# Patient Record
Sex: MALE | Race: WHITE | Employment: FULL TIME | URBAN - NONMETROPOLITAN AREA
[De-identification: names, ages, dates, MRNs, and addresses within clinical notes are randomized per-mention and may not be internally consistent; named-entity substitution may affect disease eponyms.]

---

## 2022-01-26 ENCOUNTER — OFFICE VISIT (OUTPATIENT)
Dept: VASCULAR SURGERY | Age: 37
End: 2022-01-26
Payer: OTHER GOVERNMENT

## 2022-01-26 VITALS
SYSTOLIC BLOOD PRESSURE: 132 MMHG | TEMPERATURE: 97.8 F | OXYGEN SATURATION: 98 % | HEIGHT: 71 IN | HEART RATE: 87 BPM | DIASTOLIC BLOOD PRESSURE: 98 MMHG

## 2022-01-26 DIAGNOSIS — I70.213 ATHEROSCLEROSIS OF NATIVE ARTERIES OF EXTREMITIES WITH INTERMITTENT CLAUDICATION, BILATERAL LEGS (HCC): Primary | ICD-10-CM

## 2022-01-26 PROCEDURE — 99204 OFFICE O/P NEW MOD 45 MIN: CPT | Performed by: PHYSICIAN ASSISTANT

## 2022-01-26 RX ORDER — MELOXICAM 15 MG/1
1 TABLET ORAL DAILY
COMMUNITY
Start: 2021-12-27 | End: 2022-04-06

## 2022-01-26 RX ORDER — ASPIRIN 81 MG/1
81 TABLET ORAL DAILY
COMMUNITY
End: 2022-03-03 | Stop reason: HOSPADM

## 2022-01-26 NOTE — PROGRESS NOTES
No care team member to display      History and Physical  Mr. Kamar Hodge  is a 25-year-old male who has a history that includes tobacco abuse, a history of chronic back pain status post back surgery. He presents today as referral from Dr. Sabrina Hastings for evaluation of peripheral vascular disease. Mr. Kamar Hodge reports that at approximately 15-20 stents he has pain in his feet that is now starting to radiate up into his calves. He reports at this time the left leg is worse than the right he does report at times that his pain in his feet will wake him up at night. He is currently on aspirin daily. He has had issues with slow healing fissures of the distal toes bilaterally. He does smoke at this time. He also reports that at times he does not drink. Old records have been obtained, reviewed, and summarized. Warden Love is a 39 y.o. male with the [facility] There are no problems to display for this patient. Current Outpatient Medications   Medication Sig Dispense Refill    aspirin 81 MG EC tablet Take 81 mg by mouth daily      meloxicam (MOBIC) 15 MG tablet Take 1 tablet by mouth daily (Patient not taking: Reported on 1/26/2022)       No current facility-administered medications for this visit. Allergies: Sulfa antibiotics  No past medical history on file. No past surgical history on file. No family history on file. Social History     Tobacco Use    Smoking status: Not on file    Smokeless tobacco: Not on file   Substance Use Topics    Alcohol use: Not on file       Review of Systems    Constitutional -   No fever or chills   HENT - no HA's, tinnitus, rhinorrhea, sore throat  Eyes - no sudden vision change or amaurosis. Respiratory - SOB or chest pain  Cardiovascular - no chest pain, syncope, or significant dizziness. No palpitations. See HPI   Gastrointestinal - no significant abdominal pain. No blood in stool.   No diarrhea, nausea, or vomiting. Genitourinary - No difficulty urinating, dysuria, frequency, or urgency. No flank pain or hematuria. Musculoskeletal - no back pain or myalgia. Skin - no rashes or wounds   Neurologic - no dizziness, facial asymmetry, or light headedness. No seizures. No new onset of partial or complete loss of vision affecting only one eye, speech difficulty or lateralizing weakness, numbness/tingling   Hematologic - no excessive bleeding. Psychiatric - no severe anxiety or nervousness. No confusion. All other review of systems are negative. Physical Exam    BP (!) 132/98 (Site: Right Upper Arm)   Pulse 87   Temp 97.8 °F (36.6 °C)   Ht 5' 11\" (1.803 m)   SpO2 98%     Constitutional - No acute distress. HENT - head normocephalic. Hearing is intact   Eyes - conjunctiva normal.  EOMS normal.  No exudate. No icterus. Neck- ROM appears normal, no tracheal deviation. Cardiovascular - Regular rate and rhythm. Heart sounds are normal.  No murmur, rub, or gallop. Carotid pulses bilaterally without bruit. Extremities - Radial and ulnar pulses are 2+ to palpation bilaterally. Bilateral femoral and DP pulses palpable bilaterally. Feet are hyperemic. He has what appears to be healing fissures noted on the distal aspects of his great toes bilaterally. No cyanosis, clubbing, or significant edema. No signs atheroembolic event. Pulmonary - effort appears normal.  No respiratory distress. Lungs - Breath sounds normal.   GI - Abdomen - No distension or palpable mass. Genitourinary - deferred. Musculoskeletal - ROM appears normal.  No significant edema. Neurologic - alert and oriented X 3. Face symmetric. No lateralizing weakness noted. Skin - warm, dry, and intact. No rash, erythema, or pallor.   Psychiatric - mood, affect, and behavior appear normal.  Judgment and thought processes appear normal.  Lower  Risk factors for atherosclerosis of all vascular beds have been reviewed with the patient including:  Family history, tobacco abuse in all forms, elevated cholesterol, hyperlipidemia, and diabetes. Lower extremity arterial Doppler: Done at [facility] 1/13/2022 (full report scanned under media)  Conclusion monophasic flow seen in the bilateral mid posterior tibial arteries occluded bilateral distal posterior tibial arteries  Individual films reviewed: Yes. Test results were reviewed with the patient. Options were discussed with the patient including continued medical management versus proceeding with angiography and potential intervention for PVD with claudication. Patient has opted to proceed with angiography. Risks have been discussed with the patient including but not limited to MI, death, CVA, bleed, nerve injury, infection, contrast nephropathy, possible need for dialysis, and need for further surgery. Assessment      1.  Atherosclerosis of native arteries of extremities with intermittent claudication, bilateral legs (HCC)          Plan      Recommend he continue aspirin 81 mg daily  Recommend he discuss initiation of statin therapy with his PCP  Recommend he walk as much as possible  We discussed smoking cessation    Aortogram with bilateral runoff; possible angioplasty/atherectomy/stent

## 2022-01-27 ENCOUNTER — TELEPHONE (OUTPATIENT)
Dept: VASCULAR SURGERY | Age: 37
End: 2022-01-27

## 2022-01-27 DIAGNOSIS — Z01.818 PRE-OP TESTING: Primary | ICD-10-CM

## 2022-01-27 NOTE — TELEPHONE ENCOUNTER
I sw the pt to let him know he is scheduled for his procedure with Dr. Wade Vance on Mon. 1/31/2022. The pt will need to arrive at [facility] at 0630 and be NPO after midnight the night before. No special instructions with the pt's current medications. He will need to have labs and a covid test on Fri 1/28/2022 before 1430 at the [facility] lab. Please buy a bottle of hibiclens soap, wash all over the night prior and your groin the morning of. You will need a  to take you home. I asked the pt if he had any questions at this time? He declined. Pt voiced understanding and is aware of these instructions.

## 2022-01-28 ENCOUNTER — PREP FOR PROCEDURE (OUTPATIENT)
Dept: VASCULAR SURGERY | Age: 37
End: 2022-01-28

## 2022-01-28 DIAGNOSIS — Z01.818 PRE-OP TESTING: ICD-10-CM

## 2022-01-28 DIAGNOSIS — I70.213 ATHEROSCLEROSIS OF NATIVE ARTERIES OF EXTREMITIES WITH INTERMITTENT CLAUDICATION, BILATERAL LEGS (HCC): Primary | ICD-10-CM

## 2022-01-28 LAB
ANION GAP SERPL CALCULATED.3IONS-SCNC: 15 MMOL/L (ref 7–19)
BUN BLDV-MCNC: 11 MG/DL (ref 6–20)
CALCIUM SERPL-MCNC: 9.4 MG/DL (ref 8.6–10)
CHLORIDE BLD-SCNC: 101 MMOL/L (ref 98–111)
CO2: 22 MMOL/L (ref 22–29)
CREAT SERPL-MCNC: 1 MG/DL (ref 0.5–1.2)
GFR AFRICAN AMERICAN: >59
GFR NON-AFRICAN AMERICAN: >60
GLUCOSE BLD-MCNC: 176 MG/DL (ref 74–109)
HCT VFR BLD CALC: 48.4 % (ref 42–52)
HEMOGLOBIN: 15.8 G/DL (ref 14–18)
MCH RBC QN AUTO: 30 PG (ref 27–31)
MCHC RBC AUTO-ENTMCNC: 32.6 G/DL (ref 33–37)
MCV RBC AUTO: 91.8 FL (ref 80–94)
PDW BLD-RTO: 13.1 % (ref 11.5–14.5)
PLATELET # BLD: 377 K/UL (ref 130–400)
PMV BLD AUTO: 9.7 FL (ref 9.4–12.4)
POTASSIUM SERPL-SCNC: 4.3 MMOL/L (ref 3.5–5)
RBC # BLD: 5.27 M/UL (ref 4.7–6.1)
SODIUM BLD-SCNC: 138 MMOL/L (ref 136–145)
WBC # BLD: 8.2 K/UL (ref 4.8–10.8)

## 2022-01-28 RX ORDER — SODIUM CHLORIDE 9 MG/ML
INJECTION, SOLUTION INTRAVENOUS CONTINUOUS
Status: CANCELLED | OUTPATIENT
Start: 2022-01-28

## 2022-01-28 RX ORDER — SODIUM CHLORIDE 9 MG/ML
25 INJECTION, SOLUTION INTRAVENOUS PRN
Status: CANCELLED | OUTPATIENT
Start: 2022-01-28

## 2022-01-28 RX ORDER — SODIUM CHLORIDE 0.9 % (FLUSH) 0.9 %
10 SYRINGE (ML) INJECTION PRN
Status: CANCELLED | OUTPATIENT
Start: 2022-01-28

## 2022-01-28 RX ORDER — ASPIRIN 81 MG/1
81 TABLET ORAL ONCE
Status: CANCELLED | OUTPATIENT
Start: 2022-01-28 | End: 2022-01-28

## 2022-01-28 NOTE — H&P
Patient Care Team:  Jose Antonio Joe MD as Consulting Physician (Vascular Surgery)      History and Physical  Mr. Jeet Dahl  is a 48-year-old male who has a history that includes tobacco abuse, a history of chronic back pain status post back surgery. He presents today as referral from Dr. Jana Siddiqui for evaluation of peripheral vascular disease. Mr. Jeet Dahl reports that at approximately 15-20 stents he has pain in his feet that is now starting to radiate up into his calves. He reports at this time the left leg is worse than the right he does report at times that his pain in his feet will wake him up at night. He is currently on aspirin daily. He has had issues with slow healing fissures of the distal toes bilaterally. He does smoke at this time. He also reports that at times he does not drink. Old records have been obtained, reviewed, and summarized. Yeni Luna is a 39 y.o. male with the [facility] There are no problems to display for this patient. Current Outpatient Medications   Medication Sig Dispense Refill    meloxicam (MOBIC) 15 MG tablet Take 1 tablet by mouth daily (Patient not taking: Reported on 1/26/2022)      aspirin 81 MG EC tablet Take 81 mg by mouth daily       No current facility-administered medications for this visit. Allergies: Sulfa antibiotics  No past medical history on file. No past surgical history on file. No family history on file. Social History     Tobacco Use    Smoking status: Not on file    Smokeless tobacco: Not on file   Substance Use Topics    Alcohol use: Not on file       Review of Systems    Constitutional -   No fever or chills   HENT - no HA's, tinnitus, rhinorrhea, sore throat  Eyes - no sudden vision change or amaurosis. Respiratory - SOB or chest pain  Cardiovascular - no chest pain, syncope, or significant dizziness. No palpitations. See HPI   Gastrointestinal - no significant abdominal pain.   No blood in stool. No diarrhea, nausea, or vomiting. Genitourinary - No difficulty urinating, dysuria, frequency, or urgency. No flank pain or hematuria. Musculoskeletal - no back pain or myalgia. Skin - no rashes or wounds   Neurologic - no dizziness, facial asymmetry, or light headedness. No seizures. No new onset of partial or complete loss of vision affecting only one eye, speech difficulty or lateralizing weakness, numbness/tingling   Hematologic - no excessive bleeding. Psychiatric - no severe anxiety or nervousness. No confusion. All other review of systems are negative. Physical Exam    Constitutional - No acute distress. HENT - head normocephalic. Hearing is intact   Eyes - conjunctiva normal.  EOMS normal.  No exudate. No icterus. Neck- ROM appears normal, no tracheal deviation. Cardiovascular - Regular rate and rhythm. Heart sounds are normal.  No murmur, rub, or gallop. Carotid pulses bilaterally without bruit. Extremities - Radial and ulnar pulses are 2+ to palpation bilaterally. Bilateral femoral and DP pulses palpable bilaterally. Feet are hyperemic. He has what appears to be healing fissures noted on the distal aspects of his great toes bilaterally. No cyanosis, clubbing, or significant edema. No signs atheroembolic event. Pulmonary - effort appears normal.  No respiratory distress. Lungs - Breath sounds normal.   GI - Abdomen - No distension or palpable mass. Genitourinary - deferred. Musculoskeletal - ROM appears normal.  No significant edema. Neurologic - alert and oriented X 3. Face symmetric. No lateralizing weakness noted. Skin - warm, dry, and intact. No rash, erythema, or pallor.   Psychiatric - mood, affect, and behavior appear normal.  Judgment and thought processes appear normal.  Lower  Risk factors for atherosclerosis of all vascular beds have been reviewed with the patient including:  Family history, tobacco abuse in all forms, elevated cholesterol, hyperlipidemia, and diabetes. Lower extremity arterial Doppler: Done at [facility] 1/13/2022 (full report scanned under media)  Conclusion monophasic flow seen in the bilateral mid posterior tibial arteries occluded bilateral distal posterior tibial arteries  Individual films reviewed: Yes. Test results were reviewed with the patient. Options were discussed with the patient including continued medical management versus proceeding with angiography and potential intervention for PVD with claudication. Patient has opted to proceed with angiography. Risks have been discussed with the patient including but not limited to MI, death, CVA, bleed, nerve injury, infection, contrast nephropathy, possible need for dialysis, and need for further surgery. Assessment      1. Atherosclerosis of native arteries of extremities with intermittent claudication, bilateral legs ([facility] [address]          Plan      Recommend he continue aspirin 81 mg daily  Recommend he discuss initiation of statin therapy with his PCP  Recommend he walk as much as possible  We discussed smoking cessation    Aortogram with bilateral runoff; possible angioplasty/atherectomy/stent        Please note that parts of the chart were generated using Dragon dictation software. Although every effort was made to ensure the accuracy of this automated transcription, some errors in transcription may have occurred.

## 2022-01-29 LAB — SARS-COV-2, PCR: DETECTED

## 2022-01-31 ENCOUNTER — TELEPHONE (OUTPATIENT)
Dept: VASCULAR SURGERY | Age: 37
End: 2022-01-31

## 2022-01-31 NOTE — TELEPHONE ENCOUNTER
I sw the pt to let him know I need to have a few questions answered prior to r/s his procedure. I want to verify the timeframe I'm allowed to r/s a covid + pt. The pt understood and will wait for my call with further recommendation.

## 2022-02-08 ENCOUNTER — TELEPHONE (OUTPATIENT)
Dept: VASCULAR SURGERY | Age: 37
End: 2022-02-08

## 2022-02-08 NOTE — TELEPHONE ENCOUNTER
I sent the pt the following information in [employer].       +++Please make sure to have labs and covid testing at the [address] [place] [address] Patient Lab on Tue 3/1/2022 before 2:00 pm.+++            [facility]    Arteriogram Instructions    1. Report to the [facility] at [facility] (go in the front door and to the left) on [address] 3/3/2022, at 6:00 am.  2. Nothing to eat or drink after midnight the night before the procedure. 3. Please take all medications as normally scheduled to take, including heart and blood pressure medicines with a sip of water. 4. Do not take Lasix, insulin, or any diabetic medicine the morning of the procedure. If you take insulin, you may only take 1/2 of any scheduled nightly dose, and none the morning of the procedure. You may take all regularly scheduled heart, cholesterol, and blood pressure medicines with a sip of water. 5. If you take Glucophage, Metformin, Actos Plus Met, or Glucovance,you can not take this the day of your procedure and two days after the procedure. 6. Hold aspirin for 0 days prior to surgery. 7. If you have sleep apnea and require C-PAP, please bring this with you to the hospital.  8. Bring a list of all of your allergies and medications with you to the hospital.  9. Please let our nurse know if you have had an allergy to iodine, shellfish, or x-ray dye. 10. Let the nurse know if you take any of the followin. Over the counter herbal supplements  2. Diclofenec, indomethacin, ketoprofen, Caridopa/levadopa, naproxen, sulindac, piroxicam, glucosamine, Chondrotin, cocchine, or methotrexate. 11. Plan to stay at the hospital for 4 - 6 hours before being released  by the physician. You will need someone to drive you home after the procedure. 12. Medications instructed to hold: See Above   13. Please stop at your local [facility] or pharmacy and buy a bottle of Hibiclens.  Wash thoroughly with this the night before and the morning of the procedure, paying special attention to the area that will be operated on. Make sure you rinse very well. The Hibiclens should only be used prior to surgery. 15. New policy requires that anyone who comes into the hospital will be required to wear a face mask. A cloth mask is acceptable. 15. To ensure the health and safety of our patients and staff, [facility] has implemented visitor restrictions. Only one person will be allowed to accompany you for your procedure. If you or your visitor are exhibiting signs & symptoms of illness such as fever, cough, sore throat or body aches, we ask that you reschedule your procedure to a later date after your symptoms have been resolved. 16. Other Directions: If you have any questions or concerns please contact our office at [phone] and ask for Imelda Tsai, or reply to this message. Unless instructed otherwise by your physician, cleanse incision/puncture site twice daily with soap and water. Apply dry gauze. Do not get in tub. Okay to shower. Do not apply any salve, cream, peroxide or alcohol to the incision. Call with any increasing redness or drainage    PLEASE NOTE:  If the patient is unable to sign his/her own paperwork, the appointed caregiver (POA, child, sibling, etc) must be present at the time of registration for all testing and procedures. Transportation to and from all testing and procedure appointments is the sole responsibility of the patient, caregiver, and/or nursing facility in which they reside. Please remember you will not be able to drive after you are discharged. Please call the office at [phone] with any questions or concerns. Please allow 48-72 hours notice for cancellations or rescheduling. We will attempt to accommodate your needs to the best of our capabilities, however, strict policies with procedure room availability does not allow much flexibility.

## 2022-02-28 ENCOUNTER — OFFICE VISIT (OUTPATIENT)
Dept: VASCULAR SURGERY | Age: 37
End: 2022-02-28
Payer: OTHER GOVERNMENT

## 2022-02-28 VITALS
TEMPERATURE: 98.6 F | RESPIRATION RATE: 18 BRPM | SYSTOLIC BLOOD PRESSURE: 134 MMHG | HEART RATE: 93 BPM | DIASTOLIC BLOOD PRESSURE: 98 MMHG | OXYGEN SATURATION: 98 %

## 2022-02-28 DIAGNOSIS — I70.213 ATHEROSCLEROSIS OF NATIVE ARTERIES OF EXTREMITIES WITH INTERMITTENT CLAUDICATION, BILATERAL LEGS (HCC): Primary | ICD-10-CM

## 2022-02-28 PROCEDURE — 99214 OFFICE O/P EST MOD 30 MIN: CPT | Performed by: PHYSICIAN ASSISTANT

## 2022-02-28 NOTE — H&P
Patient Care Team:  Joana Whitman MD as Consulting Physician (Vascular Surgery)      History and Physical Addendum)  Mr. Ignacio Jimenez  is a 70-year-old male who has a history that includes tobacco abuse, a history of chronic back pain status post back surgery. He presents today as referral from Dr. Joesph Lal for evaluation of peripheral vascular disease. Mr. Ignacio Jimenez reports that at approximately 15-20 stents he has pain in his feet that is now starting to radiate up into his calves. He reports at this time the left leg is worse than the right he does report at times that his pain in his feet will wake him up at night. He is currently on aspirin daily. He has had issues with slow healing fissures of the distal toes bilaterally. He does smoke at this time. He also reports that at times he does not drink. He comes in today for preop evaluation prior to receiving with angiogram.  He was previously scheduled and had to be rescheduled due to testing positive for Covid. He denies any fever, cough, headaches or nausea vomiting. Lorenzo Grossman is a 39 y.o. male with the following history reviewed and recorded in [employer]: There are no problems to display for this patient. Current Outpatient Medications   Medication Sig Dispense Refill    meloxicam (MOBIC) 15 MG tablet Take 1 tablet by mouth daily       aspirin 81 MG EC tablet Take 81 mg by mouth daily       No current facility-administered medications for this visit. Allergies: Sulfa antibiotics  History reviewed. No pertinent past medical history. History reviewed. No pertinent surgical history. History reviewed. No pertinent family history.   Social History     Tobacco Use    Smoking status: Current Some Day Smoker     Packs/day: 1.00    Smokeless tobacco: Current User   Substance Use Topics    Alcohol use: Not on file       Review of Systems    Constitutional -   No fever or chills   HENT - no HA's, tinnitus, rhinorrhea, sore throat  Eyes - no sudden vision change or amaurosis. Respiratory - SOB or chest pain  Cardiovascular - no chest pain, syncope, or significant dizziness. No palpitations. See HPI   Gastrointestinal - no significant abdominal pain. No blood in stool. No diarrhea, nausea, or vomiting. Genitourinary - No difficulty urinating, dysuria, frequency, or urgency. No flank pain or hematuria. Musculoskeletal - no back pain or myalgia. Skin - no rashes. Superficial wound left great toe  Neurologic - no dizziness, facial asymmetry, or light headedness. No seizures. No new onset of partial or complete loss of vision affecting only one eye, speech difficulty or lateralizing weakness, numbness/tingling   Hematologic - no excessive bleeding. Psychiatric - no severe anxiety or nervousness. No confusion. All other review of systems are negative. Physical Exam    Constitutional - No acute distress. HENT - head normocephalic. Hearing is intact   Eyes - conjunctiva normal.  EOMS normal.  No exudate. No icterus. Neck- ROM appears normal, no tracheal deviation. Cardiovascular - Regular rate and rhythm. Heart sounds are normal.  No murmur, rub, or gallop. Carotid pulses bilaterally without bruit. Extremities - Radial and ulnar pulses are 2+ to palpation bilaterally. Bilateral femoral and DP pulses palpable bilaterally. Feet are hyperemic. He has what appears to be healing fissures noted on the distal aspects of his great toes bilaterally. No cyanosis, clubbing, or significant edema. No signs atheroembolic event. Pulmonary - effort appears normal.  No respiratory distress. Lungs - Breath sounds normal.   GI - Abdomen - No distension or palpable mass. Genitourinary - deferred. Musculoskeletal - ROM appears normal.  No significant edema. Neurologic - alert and oriented X 3. Face symmetric. No lateralizing weakness noted. Skin - warm, dry, and intact. No rash, erythema, or pallor.  Superficial wound distal tip of left great toe psychiatric - mood, affect, and behavior appear normal.  Judgment and thought processes appear normal.        Risk factors for atherosclerosis of all vascular beds have been reviewed with the patient including:  Family history, tobacco abuse in all forms, elevated cholesterol, hyperlipidemia, and diabetes. Lower extremity arterial Doppler: Done at [facility] 1/13/2022 (full report scanned under media)  Conclusion monophasic flow seen in the bilateral mid posterior tibial arteries occluded bilateral distal posterior tibial arteries  Individual films reviewed: Yes. Test results were reviewed with the patient. Options were discussed with the patient including continued medical management versus proceeding with angiography and potential intervention for PVD with claudication. Patient has opted to proceed with angiography. Risks have been discussed with the patient including but not limited to MI, death, CVA, bleed, nerve injury, infection, contrast nephropathy, possible need for dialysis, and need for further surgery. Assessment      1. Atherosclerosis of native arteries of extremities with intermittent claudication, bilateral legs ([facility] [address]          Plan      Recommend he continue aspirin 81 mg daily  Recommend he discuss initiation of statin therapy with his PCP  Recommend he walk as much as possible  We discussed smoking cessation    Aortogram with bilateral runoff; possible angioplasty/atherectomy/stent        Please note that parts of the chart were generated using Dragon dictation software. Although every effort was made to ensure the accuracy of this automated transcription, some errors in transcription may have occurred.

## 2022-02-28 NOTE — PROGRESS NOTES
Patient Care Team:  Sandro Patel MD as Consulting Physician (Vascular Surgery)      History and Physical (Addendum) 2/28/22  Mr. Woody Kwok  is a 34-year-old male who has a history that includes tobacco abuse, a history of chronic back pain status post back surgery. He presents today as referral from Dr. Alpesh Ramírez for evaluation of peripheral vascular disease. Mr. Woody Kwok reports that at approximately 15-20 steps he has pain in his feet that is now starting to radiate up into his calves. He reports at this time the left leg is worse than the right he does report at times that his pain in his feet will wake him up at night. He is currently on aspirin daily. He has had issues with slow healing fissures of the distal toes bilaterally. He does smoke at this time. He also reports that at times he does not drink. Mr. Teddy Lagos procedure had to be rescheduled due to testing positive for Covid. Today he denies any fever, headaches, cough or nausea or vomiting. Old records have been obtained, reviewed, and summarized. Yuan Gatica is a 39 y.o. male with the following history reviewed and recorded in [employer] There are no problems to display for this patient. Current Outpatient Medications   Medication Sig Dispense Refill    meloxicam (MOBIC) 15 MG tablet Take 1 tablet by mouth daily       aspirin 81 MG EC tablet Take 81 mg by mouth daily       No current facility-administered medications for this visit. Allergies: Sulfa antibiotics  History reviewed. No pertinent past medical history. History reviewed. No pertinent surgical history. History reviewed. No pertinent family history.   Social History     Tobacco Use    Smoking status: Current Some Day Smoker     Packs/day: 1.00    Smokeless tobacco: Current User   Substance Use Topics    Alcohol use: Not on file       Review of Systems    Constitutional -   No fever or chills   HENT - no HA's, tinnitus, rhinorrhea, sore throat  Eyes - no sudden vision change or amaurosis. Respiratory - SOB or chest pain  Cardiovascular - no chest pain, syncope, or significant dizziness. No palpitations. See HPI   Gastrointestinal - no significant abdominal pain. No blood in stool. No diarrhea, nausea, or vomiting. Genitourinary - No difficulty urinating, dysuria, frequency, or urgency. No flank pain or hematuria. Musculoskeletal - no back pain or myalgia. Skin - no rashes. Superficial wound distal tip left great toe  Neurologic - no dizziness, facial asymmetry, or light headedness. No seizures. No new onset of partial or complete loss of vision affecting only one eye, speech difficulty or lateralizing weakness, numbness/tingling   Hematologic - no excessive bleeding. Psychiatric - no severe anxiety or nervousness. No confusion. All other review of systems are negative. Physical Exam    Constitutional - No acute distress. HENT - head normocephalic. Hearing is intact   Eyes - conjunctiva normal.  EOMS normal.  No exudate. No icterus. Neck- ROM appears normal, no tracheal deviation. Cardiovascular - Regular rate and rhythm. Heart sounds are normal.  No murmur, rub, or gallop. Carotid pulses bilaterally without bruit. Extremities - Radial and ulnar pulses are 2+ to palpation bilaterally. Bilateral femoral and DP pulses palpable bilaterally. Feet are hyperemic. He has what appears to be healing fissures noted on the distal aspects of his great toes bilaterally. No cyanosis, clubbing, or significant edema. No signs atheroembolic event. Pulmonary - effort appears normal.  No respiratory distress. Lungs - Breath sounds normal.   GI - Abdomen - No distension or palpable mass. Genitourinary - deferred. Musculoskeletal - ROM appears normal.  No significant edema. Neurologic - alert and oriented X 3. Face symmetric. No lateralizing weakness noted. Skin - warm, dry, and intact. No rash, erythema, or pallor.   Psychiatric - mood, affect, and behavior appear normal.  Judgment and thought processes appear normal.  Lower  Risk factors for atherosclerosis of all vascular beds have been reviewed with the patient including:  Family history, tobacco abuse in all forms, elevated cholesterol, hyperlipidemia, and diabetes. Lower extremity arterial Doppler: Done at [facility] 1/13/2022 (full report scanned under media)  Conclusion monophasic flow seen in the bilateral mid posterior tibial arteries occluded bilateral distal posterior tibial arteries  Individual films reviewed: Yes. Test results were reviewed with the patient. Options were discussed with the patient including continued medical management versus proceeding with angiography and potential intervention for PVD with claudication. Patient has opted to proceed with angiography. Risks have been discussed with the patient including but not limited to MI, death, CVA, bleed, nerve injury, infection, contrast nephropathy, possible need for dialysis, and need for further surgery. Assessment      1. Atherosclerosis of native arteries of extremities with intermittent claudication, bilateral legs ([facility] [address]          Plan      Recommend he continue aspirin 81 mg daily  Recommend he discuss initiation of statin therapy with his PCP  Recommend he walk as much as possible  We discussed smoking cessation    Aortogram with bilateral runoff; possible angioplasty/atherectomy/stent        Please note that parts of the chart were generated using Dragon dictation software. Although every effort was made to ensure the accuracy of this automated transcription, some errors in transcription may have occurred.

## 2022-03-01 DIAGNOSIS — Z11.59 SCREENING FOR VIRAL DISEASE: ICD-10-CM

## 2022-03-01 DIAGNOSIS — Z01.818 PRE-OP TESTING: Primary | ICD-10-CM

## 2022-03-01 LAB
ANION GAP SERPL CALCULATED.3IONS-SCNC: 14 MMOL/L (ref 7–19)
BASOPHILS ABSOLUTE: 0 K/UL (ref 0–0.2)
BASOPHILS RELATIVE PERCENT: 0.3 % (ref 0–1)
BUN BLDV-MCNC: 8 MG/DL (ref 6–20)
CALCIUM SERPL-MCNC: 9.8 MG/DL (ref 8.6–10)
CHLORIDE BLD-SCNC: 104 MMOL/L (ref 98–111)
CO2: 23 MMOL/L (ref 22–29)
CREAT SERPL-MCNC: 0.8 MG/DL (ref 0.5–1.2)
EOSINOPHILS ABSOLUTE: 0.2 K/UL (ref 0–0.6)
EOSINOPHILS RELATIVE PERCENT: 1.8 % (ref 0–5)
GFR AFRICAN AMERICAN: >59
GFR NON-AFRICAN AMERICAN: >60
GLUCOSE BLD-MCNC: 100 MG/DL (ref 74–109)
HCT VFR BLD CALC: 46.4 % (ref 42–52)
HEMOGLOBIN: 15.6 G/DL (ref 14–18)
IMMATURE GRANULOCYTES #: 0 K/UL
LYMPHOCYTES ABSOLUTE: 3.3 K/UL (ref 1.1–4.5)
LYMPHOCYTES RELATIVE PERCENT: 34.9 % (ref 20–40)
MCH RBC QN AUTO: 30.4 PG (ref 27–31)
MCHC RBC AUTO-ENTMCNC: 33.6 G/DL (ref 33–37)
MCV RBC AUTO: 90.4 FL (ref 80–94)
MONOCYTES ABSOLUTE: 0.5 K/UL (ref 0–0.9)
MONOCYTES RELATIVE PERCENT: 5.1 % (ref 0–10)
NEUTROPHILS ABSOLUTE: 5.5 K/UL (ref 1.5–7.5)
NEUTROPHILS RELATIVE PERCENT: 57.6 % (ref 50–65)
PDW BLD-RTO: 13.3 % (ref 11.5–14.5)
PLATELET # BLD: 418 K/UL (ref 130–400)
PMV BLD AUTO: 9.8 FL (ref 9.4–12.4)
POTASSIUM SERPL-SCNC: 4.2 MMOL/L (ref 3.5–5)
RBC # BLD: 5.13 M/UL (ref 4.7–6.1)
SARS-COV-2, PCR: NOT DETECTED
SODIUM BLD-SCNC: 141 MMOL/L (ref 136–145)
WBC # BLD: 9.6 K/UL (ref 4.8–10.8)

## 2022-03-02 ENCOUNTER — PREP FOR PROCEDURE (OUTPATIENT)
Dept: VASCULAR SURGERY | Age: 37
End: 2022-03-02

## 2022-03-02 DIAGNOSIS — I70.213 ATHEROSCLEROSIS OF NATIVE ARTERIES OF EXTREMITIES WITH INTERMITTENT CLAUDICATION, BILATERAL LEGS (HCC): Primary | ICD-10-CM

## 2022-03-02 NOTE — H&P (VIEW-ONLY)
Patient Care Team:  Yojana Escobar MD as Consulting Physician (Vascular Surgery)      History and Physical Addendum)  Mr. Skip Sung  is a 48-year-old male who has a history that includes tobacco abuse, a history of chronic back pain status post back surgery. He presents today as referral from Dr. Theresa Redmond for evaluation of peripheral vascular disease. Mr. Skip Sung reports that at approximately 15-20 stents he has pain in his feet that is now starting to radiate up into his calves. He reports at this time the left leg is worse than the right he does report at times that his pain in his feet will wake him up at night. He is currently on aspirin daily. He has had issues with slow healing fissures of the distal toes bilaterally. He does smoke at this time. He also reports that at times he does not drink. He comes in today for preop evaluation prior to receiving with angiogram.  He was previously scheduled and had to be rescheduled due to testing positive for Covid. He denies any fever, cough, headaches or nausea vomiting. Jeremy Booth is a 39 y.o. male with the following history reviewed and recorded in [facility]: There are no problems to display for this patient. Current Outpatient Medications   Medication Sig Dispense Refill    meloxicam (MOBIC) 15 MG tablet Take 1 tablet by mouth daily       aspirin 81 MG EC tablet Take 81 mg by mouth daily       No current facility-administered medications for this visit. Allergies: Sulfa antibiotics  No past medical history on file. No past surgical history on file. No family history on file. Social History     Tobacco Use    Smoking status: Current Some Day Smoker     Packs/day: 1.00    Smokeless tobacco: Current User   Substance Use Topics    Alcohol use: Not on file       Review of Systems    Constitutional    No fever or chills   HENT  no HA's, tinnitus, rhinorrhea, sore throat  Eyes  no sudden vision change or amaurosis.   Respiratory  SOB or chest pain  Cardiovascular  no chest pain, syncope, or significant dizziness. No palpitations. See HPI   Gastrointestinal  no significant abdominal pain. No blood in stool. No diarrhea, nausea, or vomiting. Genitourinary  No difficulty urinating, dysuria, frequency, or urgency. No flank pain or hematuria. Musculoskeletal  no back pain or myalgia. Skin  no rashes. Superficial wound left great toe  Neurologic  no dizziness, facial asymmetry, or light headedness. No seizures. No new onset of partial or complete loss of vision affecting only one eye, speech difficulty or lateralizing weakness, numbness/tingling   Hematologic  no excessive bleeding. Psychiatric  no severe anxiety or nervousness. No confusion. All other review of systems are negative. Physical Exam    Constitutional  No acute distress. HENT  head normocephalic. Hearing is intact   Eyes  conjunctiva normal.  EOMS normal.  No exudate. No icterus. Neck- ROM appears normal, no tracheal deviation. Cardiovascular  Regular rate and rhythm. Heart sounds are normal.  No murmur, rub, or gallop. Carotid pulses bilaterally without bruit. Extremities - Radial and ulnar pulses are 2+ to palpation bilaterally. Bilateral femoral and DP pulses palpable bilaterally. Feet are hyperemic. He has what appears to be healing fissures noted on the distal aspects of his great toes bilaterally. No cyanosis, clubbing, or significant edema. No signs atheroembolic event. Pulmonary  effort appears normal.  No respiratory distress. Lungs - Breath sounds normal.   GI - Abdomen  No distension or palpable mass. Genitourinary  deferred. Musculoskeletal  ROM appears normal.  No significant edema. Neurologic  alert and oriented X 3. Face symmetric. No lateralizing weakness noted. Skin  warm, dry, and intact. No rash, erythema, or pallor.  Superficial wound distal tip of left great toe psychiatric  mood, affect, and behavior appear normal.  Judgment and thought processes appear normal.        Risk factors for atherosclerosis of all vascular beds have been reviewed with the patient including:  Family history, tobacco abuse in all forms, elevated cholesterol, hyperlipidemia, and diabetes. Lower extremity arterial Doppler: Done at [facility] 1/13/2022 (full report scanned under media)  Conclusion monophasic flow seen in the bilateral mid posterior tibial arteries occluded bilateral distal posterior tibial arteries  Individual films reviewed: Yes. Test results were reviewed with the patient. Options were discussed with the patient including continued medical management versus proceeding with angiography and potential intervention for PVD with claudication. Patient has opted to proceed with angiography. Risks have been discussed with the patient including but not limited to MI, death, CVA, bleed, nerve injury, infection, contrast nephropathy, possible need for dialysis, and need for further surgery. Assessment      1. Atherosclerosis of native arteries of extremities with intermittent claudication, bilateral legs ([facility] [address]          Plan      Recommend he continue aspirin 81 mg daily  Recommend he discuss initiation of statin therapy with his PCP  Recommend he walk as much as possible  We discussed smoking cessation    Aortogram with bilateral runoff; possible angioplasty/atherectomy/stent        Please note that parts of the chart were generated using Dragon dictation software. Although every effort was made to ensure the accuracy of this automated transcription, some errors in transcription may have occurred.

## 2022-03-02 NOTE — H&P
Patient Care Team:  Jose Antonio Joe MD as Consulting Physician (Vascular Surgery)      History and Physical Addendum)  Mr. Jeet Dahl  is a 26-year-old male who has a history that includes tobacco abuse, a history of chronic back pain status post back surgery. He presents today as referral from Dr. Jana Siddiqui for evaluation of peripheral vascular disease. Mr. Jeet Dahl reports that at approximately 15-20 steps he has pain in his feet that is now starting to radiate up into his calves. He reports at this time the left leg is worse than the right he does report at times that his pain in his feet will wake him up at night. He is currently on aspirin daily. He has had issues with slow healing fissures of the distal toes bilaterally. He does smoke at this time. He also reports that at times he does not drink. He comes in today for preop evaluation prior to receiving with angiogram.  He was previously scheduled and had to be rescheduled due to testing positive for Covid. He denies any fever, cough, headaches or nausea vomiting. Yeni Luna is a 39 y.o. male with the following history reviewed and recorded in [facility]: There are no problems to display for this patient. Current Outpatient Medications   Medication Sig Dispense Refill    meloxicam (MOBIC) 15 MG tablet Take 1 tablet by mouth daily       aspirin 81 MG EC tablet Take 81 mg by mouth daily       No current facility-administered medications for this visit. Allergies: Sulfa antibiotics  No past medical history on file. No past surgical history on file. No family history on file. Social History     Tobacco Use    Smoking status: Current Some Day Smoker     Packs/day: 1.00    Smokeless tobacco: Current User   Substance Use Topics    Alcohol use: Not on file       Review of Systems    Constitutional -   No fever or chills   HENT - no HA's, tinnitus, rhinorrhea, sore throat  Eyes - no sudden vision change or amaurosis.   Respiratory - SOB or chest pain  Cardiovascular - no chest pain, syncope, or significant dizziness. No palpitations. See HPI   Gastrointestinal - no significant abdominal pain. No blood in stool. No diarrhea, nausea, or vomiting. Genitourinary - No difficulty urinating, dysuria, frequency, or urgency. No flank pain or hematuria. Musculoskeletal - no back pain or myalgia. Skin - no rashes. Superficial wound left great toe  Neurologic - no dizziness, facial asymmetry, or light headedness. No seizures. No new onset of partial or complete loss of vision affecting only one eye, speech difficulty or lateralizing weakness, numbness/tingling   Hematologic - no excessive bleeding. Psychiatric - no severe anxiety or nervousness. No confusion. All other review of systems are negative. Physical Exam    Constitutional - No acute distress. HENT - head normocephalic. Hearing is intact   Eyes - conjunctiva normal.  EOMS normal.  No exudate. No icterus. Neck- ROM appears normal, no tracheal deviation. Cardiovascular - Regular rate and rhythm. Heart sounds are normal.  No murmur, rub, or gallop. Carotid pulses bilaterally without bruit. Extremities - Radial and ulnar pulses are 2+ to palpation bilaterally. Bilateral femoral and DP pulses palpable bilaterally. Feet are hyperemic. He has what appears to be healing fissures noted on the distal aspects of his great toes bilaterally. No cyanosis, clubbing, or significant edema. No signs atheroembolic event. Pulmonary - effort appears normal.  No respiratory distress. Lungs - Breath sounds normal.   GI - Abdomen - No distension or palpable mass. Genitourinary - deferred. Musculoskeletal - ROM appears normal.  No significant edema. Neurologic - alert and oriented X 3. Face symmetric. No lateralizing weakness noted. Skin - warm, dry, and intact. No rash, erythema, or pallor.  Superficial wound distal tip of left great toe psychiatric - mood, affect, and behavior appear normal.  Judgment and thought processes appear normal.        Risk factors for atherosclerosis of all vascular beds have been reviewed with the patient including:  Family history, tobacco abuse in all forms, elevated cholesterol, hyperlipidemia, and diabetes. Lower extremity arterial Doppler: Done at [facility] 1/13/2022 (full report scanned under media)  Conclusion monophasic flow seen in the bilateral mid posterior tibial arteries occluded bilateral distal posterior tibial arteries  Individual films reviewed: Yes. Test results were reviewed with the patient. Options were discussed with the patient including continued medical management versus proceeding with angiography and potential intervention for PVD with claudication. Patient has opted to proceed with angiography. Risks have been discussed with the patient including but not limited to MI, death, CVA, bleed, nerve injury, infection, contrast nephropathy, possible need for dialysis, and need for further surgery. Assessment      1. Atherosclerosis of native arteries of extremities with intermittent claudication, bilateral legs ([facility] [address]          Plan      Recommend he continue aspirin 81 mg daily  Recommend he discuss initiation of statin therapy with his PCP  Recommend he walk as much as possible  We discussed smoking cessation    Aortogram with bilateral runoff; possible angioplasty/atherectomy/stent        Please note that parts of the chart were generated using Dragon dictation software. Although every effort was made to ensure the accuracy of this automated transcription, some errors in transcription may have occurred.

## 2022-03-03 ENCOUNTER — HOSPITAL ENCOUNTER (OUTPATIENT)
Dept: INTERVENTIONAL RADIOLOGY/VASCULAR | Age: 37
Discharge: HOME | End: 2022-03-03
Payer: OTHER GOVERNMENT

## 2022-03-03 VITALS
DIASTOLIC BLOOD PRESSURE: 89 MMHG | HEART RATE: 83 BPM | SYSTOLIC BLOOD PRESSURE: 148 MMHG | OXYGEN SATURATION: 97 % | HEIGHT: 70 IN | RESPIRATION RATE: 16 BRPM | BODY MASS INDEX: 27.92 KG/M2 | WEIGHT: 195 LBS | TEMPERATURE: 97.9 F

## 2022-03-03 DIAGNOSIS — I70.219 ATHEROSCLEROSIS WITH CLAUDICATION OF EXTREMITY (HCC): ICD-10-CM

## 2022-03-03 DIAGNOSIS — M79.605 LEG PAIN, BILATERAL: Primary | ICD-10-CM

## 2022-03-03 DIAGNOSIS — I70.213 ATHEROSCLEROSIS OF NATIVE ARTERY OF BOTH LOWER EXTREMITIES WITH INTERMITTENT CLAUDICATION (HCC): ICD-10-CM

## 2022-03-03 DIAGNOSIS — M79.604 LEG PAIN, BILATERAL: Primary | ICD-10-CM

## 2022-03-03 PROCEDURE — 36221 PLACE CATH THORACIC AORTA: CPT

## 2022-03-03 PROCEDURE — 99153 MOD SED SAME PHYS/QHP EA: CPT

## 2022-03-03 PROCEDURE — 99152 MOD SED SAME PHYS/QHP 5/>YRS: CPT

## 2022-03-03 PROCEDURE — 75774 ARTERY X-RAY EACH VESSEL: CPT | Performed by: SURGERY

## 2022-03-03 PROCEDURE — 2500000003 HC RX 250 WO HCPCS: Performed by: SURGERY

## 2022-03-03 PROCEDURE — 6360000002 HC RX W HCPCS: Performed by: SURGERY

## 2022-03-03 PROCEDURE — 6360000002 HC RX W HCPCS: Performed by: PHYSICIAN ASSISTANT

## 2022-03-03 PROCEDURE — 2709999900 IR AORTAGRAM ABDOMINAL SERIALOGRAM

## 2022-03-03 PROCEDURE — 75625 CONTRAST EXAM ABDOMINL AORTA: CPT | Performed by: SURGERY

## 2022-03-03 PROCEDURE — 75716 ARTERY X-RAYS ARMS/LEGS: CPT

## 2022-03-03 PROCEDURE — 2580000003 HC RX 258: Performed by: PHYSICIAN ASSISTANT

## 2022-03-03 PROCEDURE — 75625 CONTRAST EXAM ABDOMINL AORTA: CPT

## 2022-03-03 PROCEDURE — 36247 INS CATH ABD/L-EXT ART 3RD: CPT | Performed by: SURGERY

## 2022-03-03 PROCEDURE — 6360000004 HC RX CONTRAST MEDICATION: Performed by: SURGERY

## 2022-03-03 PROCEDURE — 36247 INS CATH ABD/L-EXT ART 3RD: CPT

## 2022-03-03 PROCEDURE — 75605 CONTRAST EXAM THORACIC AORTA: CPT | Performed by: SURGERY

## 2022-03-03 PROCEDURE — 6370000000 HC RX 637 (ALT 250 FOR IP): Performed by: PHYSICIAN ASSISTANT

## 2022-03-03 PROCEDURE — 75774 ARTERY X-RAY EACH VESSEL: CPT

## 2022-03-03 PROCEDURE — 75716 ARTERY X-RAYS ARMS/LEGS: CPT | Performed by: SURGERY

## 2022-03-03 RX ORDER — CEFAZOLIN SODIUM 2 G/100ML
2000 INJECTION, SOLUTION INTRAVENOUS
Status: COMPLETED | OUTPATIENT
Start: 2022-03-03 | End: 2022-03-03

## 2022-03-03 RX ORDER — SODIUM CHLORIDE 9 MG/ML
25 INJECTION, SOLUTION INTRAVENOUS PRN
Status: DISCONTINUED | OUTPATIENT
Start: 2022-03-03 | End: 2022-03-05 | Stop reason: HOSPADM

## 2022-03-03 RX ORDER — LIDOCAINE HYDROCHLORIDE 20 MG/ML
INJECTION, SOLUTION INFILTRATION; PERINEURAL
Status: COMPLETED | OUTPATIENT
Start: 2022-03-03 | End: 2022-03-03

## 2022-03-03 RX ORDER — MIDAZOLAM HYDROCHLORIDE 1 MG/ML
INJECTION INTRAMUSCULAR; INTRAVENOUS
Status: COMPLETED | OUTPATIENT
Start: 2022-03-03 | End: 2022-03-03

## 2022-03-03 RX ORDER — HYDROCODONE BITARTRATE AND ACETAMINOPHEN 5; 325 MG/1; MG/1
2 TABLET ORAL EVERY 4 HOURS PRN
Status: DISCONTINUED | OUTPATIENT
Start: 2022-03-03 | End: 2022-03-05 | Stop reason: HOSPADM

## 2022-03-03 RX ORDER — ASPIRIN 81 MG/1
81 TABLET ORAL ONCE
Status: COMPLETED | OUTPATIENT
Start: 2022-03-03 | End: 2022-03-03

## 2022-03-03 RX ORDER — FENTANYL CITRATE 50 UG/ML
INJECTION, SOLUTION INTRAMUSCULAR; INTRAVENOUS
Status: COMPLETED | OUTPATIENT
Start: 2022-03-03 | End: 2022-03-03

## 2022-03-03 RX ORDER — TRAMADOL HYDROCHLORIDE 50 MG/1
50 TABLET ORAL EVERY 8 HOURS PRN
Qty: 50 TABLET | Refills: 0 | Status: SHIPPED | OUTPATIENT
Start: 2022-03-03 | End: 2022-03-17 | Stop reason: SDUPTHER

## 2022-03-03 RX ORDER — SODIUM CHLORIDE 9 MG/ML
INJECTION, SOLUTION INTRAVENOUS CONTINUOUS
Status: DISCONTINUED | OUTPATIENT
Start: 2022-03-03 | End: 2022-03-05 | Stop reason: HOSPADM

## 2022-03-03 RX ORDER — HEPARIN SODIUM 5000 [USP'U]/ML
INJECTION, SOLUTION INTRAVENOUS; SUBCUTANEOUS
Status: COMPLETED | OUTPATIENT
Start: 2022-03-03 | End: 2022-03-03

## 2022-03-03 RX ORDER — IODIXANOL 320 MG/ML
INJECTION, SOLUTION INTRAVASCULAR
Status: COMPLETED | OUTPATIENT
Start: 2022-03-03 | End: 2022-03-03

## 2022-03-03 RX ORDER — HYDROCODONE BITARTRATE AND ACETAMINOPHEN 5; 325 MG/1; MG/1
1 TABLET ORAL EVERY 4 HOURS PRN
Status: DISCONTINUED | OUTPATIENT
Start: 2022-03-03 | End: 2022-03-05 | Stop reason: HOSPADM

## 2022-03-03 RX ORDER — ONDANSETRON 2 MG/ML
4 INJECTION INTRAMUSCULAR; INTRAVENOUS EVERY 8 HOURS PRN
Status: DISCONTINUED | OUTPATIENT
Start: 2022-03-03 | End: 2022-03-05 | Stop reason: HOSPADM

## 2022-03-03 RX ORDER — ACETAMINOPHEN 325 MG/1
650 TABLET ORAL EVERY 4 HOURS PRN
Status: DISCONTINUED | OUTPATIENT
Start: 2022-03-03 | End: 2022-03-05 | Stop reason: HOSPADM

## 2022-03-03 RX ORDER — SODIUM CHLORIDE 0.9 % (FLUSH) 0.9 %
10 SYRINGE (ML) INJECTION PRN
Status: DISCONTINUED | OUTPATIENT
Start: 2022-03-03 | End: 2022-03-05 | Stop reason: HOSPADM

## 2022-03-03 RX ADMIN — FENTANYL CITRATE 25 MCG: 50 INJECTION INTRAMUSCULAR; INTRAVENOUS at 08:35

## 2022-03-03 RX ADMIN — HEPARIN SODIUM 5000 UNITS: 5000 INJECTION INTRAVENOUS; SUBCUTANEOUS at 07:57

## 2022-03-03 RX ADMIN — CEFAZOLIN SODIUM 2000 MG: 2 INJECTION, SOLUTION INTRAVENOUS at 07:52

## 2022-03-03 RX ADMIN — LIDOCAINE HYDROCHLORIDE 10 ML: 20 INJECTION, SOLUTION INFILTRATION; PERINEURAL at 07:57

## 2022-03-03 RX ADMIN — ASPIRIN 81 MG: 81 TABLET, COATED ORAL at 06:56

## 2022-03-03 RX ADMIN — FENTANYL CITRATE 25 MCG: 50 INJECTION INTRAMUSCULAR; INTRAVENOUS at 08:17

## 2022-03-03 RX ADMIN — HEPARIN SODIUM 5000 UNITS: 5000 INJECTION INTRAVENOUS; SUBCUTANEOUS at 08:15

## 2022-03-03 RX ADMIN — MIDAZOLAM 1 MG: 1 INJECTION INTRAMUSCULAR; INTRAVENOUS at 08:35

## 2022-03-03 RX ADMIN — SODIUM CHLORIDE: 9 INJECTION, SOLUTION INTRAVENOUS at 06:51

## 2022-03-03 RX ADMIN — IODIXANOL 180 ML: 320 INJECTION, SOLUTION INTRAVASCULAR at 08:41

## 2022-03-03 RX ADMIN — MIDAZOLAM 1 MG: 1 INJECTION INTRAMUSCULAR; INTRAVENOUS at 08:17

## 2022-03-03 RX ADMIN — FENTANYL CITRATE 25 MCG: 50 INJECTION INTRAMUSCULAR; INTRAVENOUS at 08:03

## 2022-03-03 RX ADMIN — MIDAZOLAM 1 MG: 1 INJECTION INTRAMUSCULAR; INTRAVENOUS at 08:03

## 2022-03-03 NOTE — INTERVAL H&P NOTE
I agree with the history and physical exam in chart. In addition, today's complete ROS was performed and the only positives are noted in the HPI. I examined the patient preoperatively and discussed the surgery, including the risks, benefits, and alternatives. They seem to understand and agree to proceed.   Asa iii, [address]

## 2022-03-03 NOTE — PROGRESS NOTES
Patient and wife instructed on care of right groin site post arteriogram.  Given written instructions. Both stated understanding.

## 2022-03-03 NOTE — PROGRESS NOTES
Returned post arteriogram.  Awake and alert. Puncture site to right groin clear with gauze and tegaderm dressing in place. Denies any c/o pain.

## 2022-03-04 ENCOUNTER — TELEPHONE (OUTPATIENT)
Dept: VASCULAR SURGERY | Age: 37
End: 2022-03-04

## 2022-03-04 NOTE — TELEPHONE ENCOUNTER
Patient called stating he was nauseated and light headed. Patient reported he was started on a blood thinner and tramadol. Consulted Serge Smyth CMA and told the patient to make sure he was taking the medication with food. Also that he did not have to take the tramadol unless he was in pain, but since he had a stent placed the blood thinner was important. Informed the patient to monitor for any other symptoms and if felt worse or developed any other symptoms since we will be closed over the weekend to go to the ER. I told the patient to call back Monday if still experiencing those symptoms. Patient voiced understanding.

## 2022-03-10 NOTE — OP NOTE
Preprocedure diagnosis:  1. Atherosclerosis of native arteries bilateral lower extremities with lifestyle limiting claudication right lower extremity and painful left great toe with a small ulcer      Post procedure diagnosis: Same    Procedure:  1. Percutaneous ultrasound-guided cannulation right common femoral artery with 5 Arabic sheath  2. Suprarenal abdominal aortogram with bilateral iliofemoral arteriogram and bilateral lower extremity arteriogram  3. Thoracic aortogram as well as supra renal abdominal aortogram  4. Selective left lower extremity arteriogram with the tip of the catheter in the left popliteal artery  5. Minx closure right common femoral artery puncture site    Surgeon: Suzy Hill MD    Anesthesia: Intravenous/moderate sedation plus local    Estimated blood loss: Less than 50 mL    Findings:  1. The ascending, transverse, and descending thoracic aorta are normal without significant stenosis nor aneurysmal change. He has a bovine arch with the left common originating from the brachiocephalic artery. 2.  The abdominal aorta is also normal without aneurysmal change nor any obvious stenosis. The bilateral common, internal, and external iliac arteries are all fairly widely patent. 3.  The right common femoral, profunda femoris, superficial femoral and popliteal arteries are all widely patent. The right anterior tibial artery is patent to the mid calf and then there is only collaterals distal to this without reconstitution of even the dorsalis pedis artery. The tibial peroneal trunk is fairly widely patent. The peroneal artery is patent to the distal calf. The posterior double arteries occluded mid and distally. 4.  The left common femoral, profunda femoris, superficial femoral, and popliteal arteries are all fairly widely patent. The left anterior tibial artery is occluded in the mid calf without any reconstitution distally. There is only collateral flow.   The left tibial peroneal trunk is fairly widely patent. Left posterior artery is occluded. The peroneal artery is also occluded on this side. Procedure in detail:    After the patient was consented and given intravenous antibiotics, is brought to angiography and placed on the table supine position. Intravenous/moderate sedation was administered and both groins were prepped and draped in usual sterile procedure. Skin and subcutaneous tissues in the right groin were anesthetized lidocaine. Micropuncture needle was used to cannulate the right common femoral artery over the femoral head under ultrasound guidance and fluoroscopic visualization. Seldinger technique was utilized place a 5 [place] glide sheath. Over an 035 wire, an Omni Flush cath was placed up into the suprarenal abdominal aorta. Suprarenal abdominal aortogram with bilateral iliofemoral arteriograms were performed with above findings. The catheter was withdrawn to the distal abdominal aorta and distal abdominal aortogram with bilateral lower extremity arteriograms were performed with the above findings. I placed a pigtail catheter up into the ascending thoracic aorta. The patient was given intravenous heparin. An arch aortogram/thoracic aortogram was performed with above findings. Finally, utilizing an angled Glidewire and the Omni Flush catheter, I passed the Glidewire down into the left distal popliteal artery and follow this with an angled glide catheter. Selective left lower extremity arteriograms were performed from the popliteal artery all the way down to the foot. Above findings were noted. The right common femoral artery sheath was removed and the puncture site closed with a minx device. The patient tolerated the procedure well and he was brought back to cath holding in good condition. These findings are highly suspicious for embolization bilaterally.   However, there is no obvious source on this exam.  Therefore, we will have cardiology see him and begin anticoagulation as well.

## 2022-03-17 ENCOUNTER — OFFICE VISIT (OUTPATIENT)
Dept: VASCULAR SURGERY | Age: 37
End: 2022-03-17
Payer: OTHER GOVERNMENT

## 2022-03-17 VITALS
OXYGEN SATURATION: 98 % | BODY MASS INDEX: 27.98 KG/M2 | HEIGHT: 70 IN | DIASTOLIC BLOOD PRESSURE: 82 MMHG | SYSTOLIC BLOOD PRESSURE: 136 MMHG | TEMPERATURE: 98.2 F | HEART RATE: 110 BPM

## 2022-03-17 DIAGNOSIS — I70.213 ATHEROSCLEROSIS OF NATIVE ARTERY OF BOTH LOWER EXTREMITIES WITH INTERMITTENT CLAUDICATION (HCC): ICD-10-CM

## 2022-03-17 DIAGNOSIS — M79.605 LEG PAIN, BILATERAL: ICD-10-CM

## 2022-03-17 DIAGNOSIS — I70.245 ATHSCL NATIVE ARTERIES OF LEFT LEG W ULCERATION OTH PRT FOOT (HCC): Primary | ICD-10-CM

## 2022-03-17 DIAGNOSIS — M79.604 LEG PAIN, BILATERAL: ICD-10-CM

## 2022-03-17 PROCEDURE — 99214 OFFICE O/P EST MOD 30 MIN: CPT | Performed by: PHYSICIAN ASSISTANT

## 2022-03-17 RX ORDER — TRAMADOL HYDROCHLORIDE 50 MG/1
50 TABLET ORAL EVERY 8 HOURS PRN
Qty: 50 TABLET | Refills: 0 | Status: SHIPPED | OUTPATIENT
Start: 2022-03-17 | End: 2022-03-31

## 2022-03-17 RX ORDER — ROSUVASTATIN CALCIUM 20 MG/1
20 TABLET, COATED ORAL NIGHTLY
Qty: 30 TABLET | Refills: 3 | Status: SHIPPED | OUTPATIENT
Start: 2022-03-17

## 2022-03-17 NOTE — PROGRESS NOTES
Patient Care Team:  ARGENTINA Bond CNP as PCP - General (Nurse Practitioner)  Casimiro Morrison MD as Consulting Physician (Vascular Surgery)      Mr. Raffi Black is a 48-year-old male who has a past medical history that includes tobacco abuse, COPD, arthritis PVD. Today he comes in for follow-up after angiogram performed on 3/3/22 for evaluation of bilateral lower extremity pain with wound left great toe. No intervention was performed during the angiogram.  Findings are suspicious for embolization bilaterally however, on  angiogram there was no obvious source identified. He is on Eliquis 5 mg twice daily. He reports continued pain in his feet especially the left great toe. He is using an over-the-counter antibacterial salve on his left great toe. He has stopped smoking cigarettes but reports he has been using tobacco pouches. He plans to switch over to nicotine patches to try to stop smoking. Horace Lopez is a 39 y.o. male with the following history reviewed and recorded in [facility]:  Patient Active Problem List    Diagnosis Date Noted    Leg pain, bilateral     Atherosclerosis with claudication of extremity ([facility] [address] 03/03/2022     Current Outpatient Medications   Medication Sig Dispense Refill    silver sulfADIAZINE (SILVADENE) 1 % cream Apply topically daily to Left Great Toe 20 g 0    rosuvastatin (CRESTOR) 20 MG tablet Take 1 tablet by mouth nightly 30 tablet 3    apixaban (ELIQUIS) 5 MG TABS tablet Take 1 tablet by mouth 2 times daily 60 tablet 5    traMADol (ULTRAM) 50 MG tablet Take 1 tablet by mouth every 8 hours as needed for Pain for up to 14 days. Intended supply: 5 days. Take lowest dose possible to manage pain 50 tablet 0    meloxicam (MOBIC) 15 MG tablet Take 1 tablet by mouth daily        No current facility-administered medications for this visit.      Allergies: Sulfa antibiotics  Past Medical History:   Diagnosis Date    Arthritis     COPD (chronic obstructive pulmonary the distal aspect of the left second toe. He has a small superficial wound on the distal aspect of the left great toe. His feet are warm and pink. To this without any  Neurologic  no dizziness, facial asymmetry, or light headedness. No seizures. No speech difficulty or lateralizing weakness. Hematologic  no easy bruising or excessive bleeding. Psychiatric  no severe anxiety or nervousness. No confusion. All other review of systems are negative. Physical Exam    /82 (Site: Right Upper Arm)   Pulse 110   Temp 98.2 °F (36.8 °C)   Ht 5' 10\" (1.778 m)   SpO2 98%   BMI 27.98 kg/m²     Constitutional  well developed, well nourished. No diaphoresis or acute distress. HENT  head normocephalic. Right external ear canal appears normal.  Left external ear canal appears normal.  Septum appears midline. Eyes  conjunctiva normal.  EOMS normal.  No exudate. No icterus. Neck- ROM appears normal, no tracheal deviation. Cardiovascular  Regular rate and rhythm. Heart sounds are normal.  No murmur, rub, or gallop. Carotid pulses are 2+ to palpation bilaterally without bruit. Extremities - Radial and brachial pulses are 2+ to palpation bilaterally. Femoral pulses are palpable. DP and PT NP. No cyanosis, clubbing, or significant edema. No signs atheroembolic event. I did not examine his groin the patient states that he has had no issues with puncture wound in his groin. He has an area of pigmentation noted left nailbed. (He reports it has been there for a while, he thinks he may have stubbed his toe. He cannot remember if it was his left great toe)  Pulmonary  effort appears normal.  Breath sounds normal.  No respiratory distress. No wheezes or rales. Abdomen  soft, non tender, bowel sounds X 4 quadrants. No guarding or rebound tenderness. No distension or palpable mass. Genitourinary  deferred. Musculoskeletal  ROM appears normal.  No significant edema.   Neurologic  alert and oriented X 3. Physiologic. Skin  warm, dry, and intact. No rash, erythema, or pallor. Psychiatric  mood, affect, and behavior appear normal.  Judgment and thought processes appear normal.    Risk factors for atherosclerosis of all vascular beds have been reviewed with the patient including:  Family history, tobacco abuse in all forms, elevated cholesterol, hyperlipidemia, and diabetes. Assessment      1. Athscl native arteries of left leg w ulceration oth prt foot ([address]          Plan      Recommend he continue Eliquis 5 mg twice daily  We will start Crestor 20 mg po at bedtime  We will obtain a fasting lipid and hepatic function panel, this will need to be repeated in 8 weeks  We will have him use Silvadene once a day to his wound left great toe  We recommend smoking cessation (he is going to try patches)  I encouraged him to keep a close eye on the pigmented spot in his left nail bed great toe, I explained to him that if this was from a traumatic injury it will grow out along with toenail. I explained to him that if this did not resolve or changed he would need to see a dermatologist to evaluate. He verbalized understanding.   We will refer to Cardiology for work-up for possible source of emboli to LE  Follow up 2 months for a fasting lipid and hepatic function panel      Diana Vazquez, APRN - CNP

## 2022-03-22 ENCOUNTER — TELEPHONE (OUTPATIENT)
Dept: VASCULAR SURGERY | Age: 37
End: 2022-03-22

## 2022-03-22 NOTE — TELEPHONE ENCOUNTER
I called the pt he did not get his lipids done, but he will sometime this week      ----- Message from Gigi Panchal PA-C sent at 3/22/2022  8:16 AM CDT -----  Please call and ask Mr. Ignacio Jimenez if he got his lab work done yesterday if not he needs to get that done when possible he needs to be fasting at least 12 hours prior to.

## 2022-03-24 DIAGNOSIS — Z01.818 PRE-OP TESTING: ICD-10-CM

## 2022-03-24 DIAGNOSIS — I70.245 ATHSCL NATIVE ARTERIES OF LEFT LEG W ULCERATION OTH PRT FOOT (HCC): ICD-10-CM

## 2022-03-24 LAB
ALBUMIN SERPL-MCNC: 4.4 G/DL (ref 3.5–5.2)
ALP BLD-CCNC: 99 U/L (ref 40–130)
ALT SERPL-CCNC: 47 U/L (ref 5–41)
ANION GAP SERPL CALCULATED.3IONS-SCNC: 13 MMOL/L (ref 7–19)
AST SERPL-CCNC: 23 U/L (ref 5–40)
BILIRUB SERPL-MCNC: 0.3 MG/DL (ref 0.2–1.2)
BILIRUBIN DIRECT: 0.1 MG/DL (ref 0–0.3)
BILIRUBIN, INDIRECT: 0.2 MG/DL (ref 0.1–1)
BUN BLDV-MCNC: 15 MG/DL (ref 6–20)
CALCIUM SERPL-MCNC: 9.4 MG/DL (ref 8.6–10)
CHLORIDE BLD-SCNC: 104 MMOL/L (ref 98–111)
CHOLESTEROL, TOTAL: 217 MG/DL (ref 160–199)
CO2: 26 MMOL/L (ref 22–29)
CREAT SERPL-MCNC: 0.9 MG/DL (ref 0.5–1.2)
GFR AFRICAN AMERICAN: >59
GFR NON-AFRICAN AMERICAN: >60
GLUCOSE BLD-MCNC: 97 MG/DL (ref 74–109)
HCT VFR BLD CALC: 44.1 % (ref 42–52)
HDLC SERPL-MCNC: 38 MG/DL (ref 55–121)
HEMOGLOBIN: 14.8 G/DL (ref 14–18)
LDL CHOLESTEROL CALCULATED: 149 MG/DL
MCH RBC QN AUTO: 30.2 PG (ref 27–31)
MCHC RBC AUTO-ENTMCNC: 33.6 G/DL (ref 33–37)
MCV RBC AUTO: 90 FL (ref 80–94)
PDW BLD-RTO: 13.1 % (ref 11.5–14.5)
PLATELET # BLD: 383 K/UL (ref 130–400)
PMV BLD AUTO: 9.4 FL (ref 9.4–12.4)
POTASSIUM SERPL-SCNC: 3.8 MMOL/L (ref 3.5–5)
RBC # BLD: 4.9 M/UL (ref 4.7–6.1)
SODIUM BLD-SCNC: 143 MMOL/L (ref 136–145)
TOTAL PROTEIN: 6.8 G/DL (ref 6.6–8.7)
TRIGL SERPL-MCNC: 148 MG/DL (ref 0–149)
WBC # BLD: 6.8 K/UL (ref 4.8–10.8)

## 2022-03-25 ENCOUNTER — TELEPHONE (OUTPATIENT)
Dept: VASCULAR SURGERY | Age: 37
End: 2022-03-25

## 2022-03-25 NOTE — TELEPHONE ENCOUNTER
I spoke with the patient on the phone regarding his lab work including CBC, BMP hepatic function panel and fasting lipid. I explained to him that his ALT was slightly elevated at 47. His cholesterol was 217 triglycerides 148, HDL 38, . He is to start on Crestor 20 mg daily we will plan to repeat his hepatic function panel and fasting lipid in 8 weeks.   He verbalized understanding

## 2022-04-01 RX ORDER — MECLIZINE HYDROCHLORIDE 25 MG/1
TABLET ORAL
Qty: 30 TABLET | Refills: 0 | Status: SHIPPED | OUTPATIENT
Start: 2022-04-01

## 2022-04-06 ENCOUNTER — OFFICE VISIT (OUTPATIENT)
Dept: CARDIOLOGY CLINIC | Age: 37
End: 2022-04-06
Payer: OTHER GOVERNMENT

## 2022-04-06 VITALS
HEIGHT: 70 IN | BODY MASS INDEX: 29.49 KG/M2 | DIASTOLIC BLOOD PRESSURE: 82 MMHG | HEART RATE: 70 BPM | SYSTOLIC BLOOD PRESSURE: 116 MMHG | WEIGHT: 206 LBS

## 2022-04-06 DIAGNOSIS — E78.2 MIXED HYPERLIPIDEMIA: ICD-10-CM

## 2022-04-06 DIAGNOSIS — I70.219 ATHEROSCLEROSIS WITH CLAUDICATION OF EXTREMITY (HCC): Primary | ICD-10-CM

## 2022-04-06 DIAGNOSIS — R00.0 TACHYCARDIA: ICD-10-CM

## 2022-04-06 DIAGNOSIS — Z72.0 TOBACCO ABUSE: ICD-10-CM

## 2022-04-06 PROCEDURE — 99203 OFFICE O/P NEW LOW 30 MIN: CPT | Performed by: CLINICAL NURSE SPECIALIST

## 2022-04-06 PROCEDURE — 93000 ELECTROCARDIOGRAM COMPLETE: CPT | Performed by: CLINICAL NURSE SPECIALIST

## 2022-04-06 RX ORDER — TRAMADOL HYDROCHLORIDE 50 MG/1
50 TABLET ORAL EVERY 6 HOURS PRN
COMMUNITY
End: 2022-04-21 | Stop reason: SDUPTHER

## 2022-04-06 NOTE — PATIENT INSTRUCTIONS
Plan  To monitor for up to 14 days. Push button for any symptoms of palpitations, racing heart, chest pain or anxiety  Stress test and 2D echo in 2 weeks after monitor is removed    Continue to be smoke-free! Avoid any excessive caffeine or other stimulants such as decongestants and steroids    Stop Eliquis and start Xarelto 20 mg daily. Take with dinner    Follow up in 4 weeks  Call with any questions or concerns  Follow up with PCP for non cardiac problems  Report any new problems  Cardiovascular Fitness-Exercise as tolerated. Strive for 30 minutes of exercise most days of the week.     Cardiac / Healthy Diet-avoid processed foods  Continue current medications as directed  Continue plan of treatment
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
## 2022-04-18 ENCOUNTER — APPOINTMENT (OUTPATIENT)
Dept: CT IMAGING | Age: 37
End: 2022-04-18
Payer: OTHER GOVERNMENT

## 2022-04-18 ENCOUNTER — HOSPITAL ENCOUNTER (EMERGENCY)
Age: 37
Discharge: HOME | End: 2022-04-18
Attending: EMERGENCY MEDICINE
Payer: OTHER GOVERNMENT

## 2022-04-18 VITALS
TEMPERATURE: 98.7 F | SYSTOLIC BLOOD PRESSURE: 132 MMHG | HEART RATE: 105 BPM | BODY MASS INDEX: 29.7 KG/M2 | RESPIRATION RATE: 18 BRPM | DIASTOLIC BLOOD PRESSURE: 85 MMHG | OXYGEN SATURATION: 96 % | WEIGHT: 207 LBS

## 2022-04-18 DIAGNOSIS — Z86.718 HISTORY OF ARTERIAL EMBOLISM: ICD-10-CM

## 2022-04-18 DIAGNOSIS — R42 EPISODIC LIGHTHEADEDNESS: Primary | ICD-10-CM

## 2022-04-18 DIAGNOSIS — Z79.01 ON CONTINUOUS ORAL ANTICOAGULATION: ICD-10-CM

## 2022-04-18 DIAGNOSIS — R06.02 SHORTNESS OF BREATH: ICD-10-CM

## 2022-04-18 LAB
ALBUMIN SERPL-MCNC: 4.5 G/DL (ref 3.5–5.2)
ALP BLD-CCNC: 106 U/L (ref 40–130)
ALT SERPL-CCNC: 64 U/L (ref 5–41)
AMPHETAMINE SCREEN, URINE: NEGATIVE
ANION GAP SERPL CALCULATED.3IONS-SCNC: 12 MMOL/L (ref 7–19)
AST SERPL-CCNC: 27 U/L (ref 5–40)
BARBITURATE SCREEN URINE: NEGATIVE
BASOPHILS ABSOLUTE: 0 K/UL (ref 0–0.2)
BASOPHILS RELATIVE PERCENT: 0.2 % (ref 0–1)
BENZODIAZEPINE SCREEN, URINE: POSITIVE
BILIRUB SERPL-MCNC: 0.3 MG/DL (ref 0.2–1.2)
BILIRUBIN URINE: NEGATIVE
BLOOD, URINE: NEGATIVE
BUN BLDV-MCNC: 10 MG/DL (ref 6–20)
CALCIUM SERPL-MCNC: 9.5 MG/DL (ref 8.6–10)
CANNABINOID SCREEN URINE: POSITIVE
CHLORIDE BLD-SCNC: 103 MMOL/L (ref 98–111)
CLARITY: CLEAR
CO2: 25 MMOL/L (ref 22–29)
COCAINE METABOLITE SCREEN URINE: NEGATIVE
COLOR: YELLOW
CREAT SERPL-MCNC: 1 MG/DL (ref 0.5–1.2)
EOSINOPHILS ABSOLUTE: 0.1 K/UL (ref 0–0.6)
EOSINOPHILS RELATIVE PERCENT: 0.7 % (ref 0–5)
ETHANOL: <10 MG/DL (ref 0–0.08)
GFR AFRICAN AMERICAN: >59
GFR NON-AFRICAN AMERICAN: >60
GLUCOSE BLD-MCNC: 121 MG/DL (ref 74–109)
GLUCOSE URINE: NEGATIVE MG/DL
HCT VFR BLD CALC: 41 % (ref 42–52)
HEMOGLOBIN: 14 G/DL (ref 14–18)
IMMATURE GRANULOCYTES #: 0 K/UL
KETONES, URINE: NEGATIVE MG/DL
LEUKOCYTE ESTERASE, URINE: NEGATIVE
LYMPHOCYTES ABSOLUTE: 2.1 K/UL (ref 1.1–4.5)
LYMPHOCYTES RELATIVE PERCENT: 25.7 % (ref 20–40)
Lab: ABNORMAL
MCH RBC QN AUTO: 30.4 PG (ref 27–31)
MCHC RBC AUTO-ENTMCNC: 34.1 G/DL (ref 33–37)
MCV RBC AUTO: 89.1 FL (ref 80–94)
MONOCYTES ABSOLUTE: 0.4 K/UL (ref 0–0.9)
MONOCYTES RELATIVE PERCENT: 4.8 % (ref 0–10)
NEUTROPHILS ABSOLUTE: 5.6 K/UL (ref 1.5–7.5)
NEUTROPHILS RELATIVE PERCENT: 68.4 % (ref 50–65)
NITRITE, URINE: NEGATIVE
OPIATE SCREEN URINE: NEGATIVE
PDW BLD-RTO: 13.3 % (ref 11.5–14.5)
PH UA: 5.5 (ref 5–8)
PLATELET # BLD: 315 K/UL (ref 130–400)
PMV BLD AUTO: 8.8 FL (ref 9.4–12.4)
POTASSIUM REFLEX MAGNESIUM: 4.3 MMOL/L (ref 3.5–5)
PRO-BNP: 33 PG/ML (ref 0–450)
PROTEIN UA: NEGATIVE MG/DL
RBC # BLD: 4.6 M/UL (ref 4.7–6.1)
SODIUM BLD-SCNC: 140 MMOL/L (ref 136–145)
SPECIFIC GRAVITY UA: 1.01 (ref 1–1.03)
TOTAL PROTEIN: 6.6 G/DL (ref 6.6–8.7)
TROPONIN: <0.01 NG/ML (ref 0–0.03)
UROBILINOGEN, URINE: 0.2 E.U./DL
WBC # BLD: 8.1 K/UL (ref 4.8–10.8)

## 2022-04-18 PROCEDURE — 85025 COMPLETE CBC W/AUTO DIFF WBC: CPT

## 2022-04-18 PROCEDURE — 71275 CT ANGIOGRAPHY CHEST: CPT

## 2022-04-18 PROCEDURE — 82077 ASSAY SPEC XCP UR&BREATH IA: CPT

## 2022-04-18 PROCEDURE — 81003 URINALYSIS AUTO W/O SCOPE: CPT

## 2022-04-18 PROCEDURE — 6360000004 HC RX CONTRAST MEDICATION: Performed by: EMERGENCY MEDICINE

## 2022-04-18 PROCEDURE — 80307 DRUG TEST PRSMV CHEM ANLYZR: CPT

## 2022-04-18 PROCEDURE — 83880 ASSAY OF NATRIURETIC PEPTIDE: CPT

## 2022-04-18 PROCEDURE — 99283 EMERGENCY DEPT VISIT LOW MDM: CPT

## 2022-04-18 PROCEDURE — 36415 COLL VENOUS BLD VENIPUNCTURE: CPT

## 2022-04-18 PROCEDURE — 84484 ASSAY OF TROPONIN QUANT: CPT

## 2022-04-18 PROCEDURE — 80053 COMPREHEN METABOLIC PANEL: CPT

## 2022-04-18 RX ORDER — HYDROXYZINE HYDROCHLORIDE 25 MG/1
25 TABLET, FILM COATED ORAL EVERY 8 HOURS PRN
Qty: 30 TABLET | Refills: 0 | Status: SHIPPED | OUTPATIENT
Start: 2022-04-18 | End: 2022-04-28

## 2022-04-18 RX ADMIN — IOPAMIDOL 90 ML: 755 INJECTION, SOLUTION INTRAVENOUS at 16:28

## 2022-04-18 ASSESSMENT — ENCOUNTER SYMPTOMS
RHINORRHEA: 0
SHORTNESS OF BREATH: 1
EYE REDNESS: 0
EYE PAIN: 0
ABDOMINAL PAIN: 0
COUGH: 0
VOMITING: 0
DIARRHEA: 0
VOICE CHANGE: 0

## 2022-04-18 NOTE — ED PROVIDER NOTES
[facility] EMERGENCY DEPT  EMERGENCY DEPARTMENT ENCOUNTER      Pt Name: Kiera Ritchie  MRN: [id number]  [place] 1985  Date of evaluation: 4/18/2022  Provider: Zachary Lorenzana MD    CHIEF COMPLAINT       Chief Complaint   Patient presents with    Anxiety         HISTORY OF PRESENT ILLNESS   (Location/Symptom, Timing/Onset,Context/Setting, Quality, Duration, Modifying Factors, Severity)  Note limiting factors. Kiera Ritchie is a 39 y.o. male who presents to the emergency department for evaluation after an episode where he got light headed, then started feeling anxious. States this episode started while he was driving without any specific trigger or preceding factors. States episode lasted several minutes and felt lightheaded like he was going to pass out. States he had 1 similar episode to this the end of last month. Patient underwent angiogram with vascular surgery here last month for bilateral lower extremity pain with wound on the left great toe, but he had findings suspicious for bilateral embolization. He was started on anticoagulation which she is still taking. He was seen in the cardiology clinic recently for evaluation of possible cardiac source of emboli. He does have reported history of anxiety and PTSD but states he had not previously had any episodes similar to this. He denies any specific preceding feeling nervous, anxious, or other situations that would have prompted a panic attack, but in retrospect he feels that may be what it was. Patient has additional testing scheduled for later this week including cardiac echo    HPI    NursingNotes were reviewed. REVIEW OF SYSTEMS    (2-9 systems for level 4, 10 or more for level 5)     Review of Systems   Constitutional: Negative for fatigue and fever. HENT: Negative for congestion, rhinorrhea and voice change. Eyes: Negative for pain and redness. Respiratory: Positive for shortness of breath. Negative for cough.     Cardiovascular: Negative antibiotics    FAMILY HISTORY       Family History   Problem Relation Age of Onset    Hypertension Father     Stroke Maternal Grandmother     Stroke Maternal Aunt           SOCIAL HISTORY       Social History     Socioeconomic History    Marital status:      Spouse name: None    Number of children: None    Years of education: None    Highest education level: None   Occupational History    None   Tobacco Use    Smoking status: Former Smoker     Packs/day: 1.00     Quit date: 3/30/2022     Years since quittin.0    Smokeless tobacco: Former User     Types: Snuff    Tobacco comment: smoked  20 years   Vaping Use    Vaping Use: Never used   Substance and Sexual Activity    Alcohol use: Yes     Comment: wine occassionally    Drug use: Never    Sexual activity: None   Other Topics Concern    None   Social History Narrative    None     Social Determinants of Health     Financial Resource Strain:     Difficulty of Paying Living Expenses: Not on file   Food Insecurity:     Worried About Running Out of Food in the Last Year: Not on file    Alvin of Food in the Last Year: Not on file   Transportation Needs:     Lack of Transportation (Medical): Not on file    Lack of Transportation (Non-Medical):  Not on file   Physical Activity:     Days of Exercise per Week: Not on file    Minutes of Exercise per Session: Not on file   Stress:     Feeling of Stress : Not on file   Social Connections:     Frequency of Communication with Friends and Family: Not on file    Frequency of Social Gatherings with Friends and Family: Not on file    Attends Hinduism Services: Not on file    Active Member of Clubs or Organizations: Not on file    Attends Club or Organization Meetings: Not on file    Marital Status: Not on file   Intimate Partner Violence:     Fear of Current or Ex-Partner: Not on file    Emotionally Abused: Not on file    Physically Abused: Not on file    Sexually Abused: Not on file Housing Stability:     Unable to Pay for Housing in the Last Year: Not on file    Number of Places Lived in the Last Year: Not on file    Unstable Housing in the Last Year: Not on file       SCREENINGS    Kael Coma Scale  Eye Opening: Spontaneous  Best Verbal Response: Oriented  Best Motor Response: Obeys commands  [place] Coma Scale Score: 15        PHYSICAL EXAM    (up to 7 for level 4, 8 or more for level 5)     ED Triage Vitals [04/18/22 1255]   BP Temp Temp Source Pulse Resp SpO2 Height Weight   (!) 149/93 98.7 °F (37.1 °C) Oral 109 22 95 % -- 207 lb (93.9 kg)       Physical Exam  Vitals and nursing note reviewed. Constitutional:       General: He is not in acute distress. Appearance: He is well-developed. He is not diaphoretic. HENT:      Head: Normocephalic and atraumatic. Eyes:      General: No scleral icterus. Neck:      Vascular: No JVD. Cardiovascular:      Rate and Rhythm: Regular rhythm. Tachycardia present. Pulses:           Radial pulses are 2+ on the right side and 2+ on the left side. Dorsalis pedis pulses are 2+ on the right side and 2+ on the left side. Heart sounds: Normal heart sounds. No murmur heard. No friction rub. No gallop. Pulmonary:      Effort: Pulmonary effort is normal. No accessory muscle usage or respiratory distress. Breath sounds: Normal breath sounds. No stridor. No decreased breath sounds, wheezing, rhonchi or rales. Chest:      Chest wall: No tenderness. Abdominal:      General: There is no distension. Palpations: Abdomen is soft. Tenderness: There is no abdominal tenderness. There is no guarding or rebound. Musculoskeletal:         General: No deformity. Normal range of motion. Right lower leg: No edema. Left lower leg: No edema. Skin:     General: Skin is warm and dry. Coloration: Skin is not pale. Findings: No erythema.    Neurological:      Mental Status: He is alert and oriented to person, place, and time. GCS: GCS eye subscore is 4. GCS verbal subscore is 5. GCS motor subscore is 6. Cranial Nerves: No cranial nerve deficit. Motor: No abnormal muscle tone. Coordination: Coordination normal.   Psychiatric:         Behavior: Behavior normal.         Judgment: Judgment normal.         DIAGNOSTIC RESULTS     EKG: All EKG's are interpreted by the Emergency Department Physician who either signs or Co-signs this chart in the absence of a cardiologist.        RADIOLOGY:   Non-plain film images such as CT, Ultrasound and MRI are read by the radiologist. Plainradiographic images are visualized and preliminarily interpreted by the emergency physician with the below findings:        Interpretation per the Radiologist below, if available at the time of this note:    CTA PULMONARY W CONTRAST   Final Result   1. No evidence of pulmonary embolus or other acute cardiopulmonary   process. 2. Postsurgical changes noted in the lower thoracic spine associated   with compression fracture at T10   Signed by Dr Vinh Smallwood            ED BEDSIDE ULTRASOUND:   Performed by ED Physician - none    LABS:  Labs Reviewed   CBC WITH AUTO DIFFERENTIAL - Abnormal; Notable for the following components:       Result Value    RBC 4.60 (*)     Hematocrit 41.0 (*)     MPV 8.8 (*)     Neutrophils % 68.4 (*)     All other components within normal limits   COMPREHENSIVE METABOLIC PANEL W/ REFLEX TO MG FOR LOW K - Abnormal; Notable for the following components:    Glucose 121 (*)     ALT 64 (*)     All other components within normal limits   URINE DRUG SCREEN - Abnormal; Notable for the following components:    Benzodiazepine Screen, Urine Positive (*)     Cannabinoid Scrn, Ur Positive (*)     All other components within normal limits   URINALYSIS WITH REFLEX TO CULTURE   ETHANOL   BRAIN NATRIURETIC PEPTIDE   TROPONIN       All other labs were within normal range or not returned as of this dictation.     EMERGENCY DEPARTMENT COURSE and DIFFERENTIALDIAGNOSIS/MDM:   Vitals:    Vitals:    04/18/22 1255 04/18/22 1630   BP: (!) 149/93 132/85   Pulse: 109 105   Resp: 22 18   Temp: 98.7 °F (37.1 °C)    TempSrc: Oral    SpO2: 95% 96%   Weight: 207 lb (93.9 kg)        [facility]  ED Course as of 04/18/22 1717   Mon Apr 18, 2022   1701 Troponin: <0.01 [RADHA]   1701 Pro-BNP: 33 [RADHA]   1712 Given patient's recent history with possible embolization to the lower extremities as well as his symptoms including lightheadedness and shortness of breath today, extensive medical work-up was initiated. No evidence of cardiac ischemia, dysrhythmia, pulmonary embolus, electrolyte derangements, or other clear medical etiology of the episode. Discussed results of work-up with patient. Given no specific preceding triggers or symptoms of anxiety or other situational factors, difficult to say whether these episodes may represent anxiety or panic attacks. Patient does note he has been under a lot of stress recently but he is not sure either whether he felt like these episodes were panic attacks or not. Patient has not depressed, homicidal, or suicidal.  Does not seem to be an acute threat to himself or others. I discussed having psychiatry come and talk with him and do a more thorough psychiatric evaluation but he declines. Agrees to follow-up with his primary care doctor after getting the rest of his work-up completed such as the echo and having his heart monitor results interpreted. [RAHDA]      ED Course User Index  [RADHA] Lea Landau., MD     Evaluation and work-up here revealed no signs of emergent or life-threatening pathology that would necessitate admission for further work-up or management at this time. Patient is felt to be stable for discharge home with return precautions for worsening of the condition or development of new concerning symptoms. Patient was encouraged to follow-up with their primary care doctor in the appropriate timeframe. Necessary prescriptions and information have been provided for treatment at home. Patient voices understanding and agreement with the plan. CONSULTS:  None    PROCEDURES:  Unless otherwise notedbelow, none     Procedures    FINAL IMPRESSION     1. Episodic lightheadedness    2. Shortness of breath    3. On continuous oral anticoagulation    4.  History of arterial embolism          DISPOSITION/PLAN   DISPOSITION Decision To Discharge 04/18/2022 05:16:07 PM      PATIENT REFERRED TO:  [facility] EMERGENCY DEPT  [facility]  [phone]    If symptoms worsen    ARGENTINA Mcfadden - [facility]  [address] [address]  [phone]    Schedule an appointment as soon as possible for a visit in 1 week        DISCHARGE MEDICATIONS:  New Prescriptions    HYDROXYZINE (ATARAX) 25 MG TABLET    Take 1 tablet by mouth every 8 hours as needed for Anxiety          (Please note that portions of this note were completed with a voice recognition program.  Efforts were made to edit the dictations butoccasionally words are mis-transcribed.)    Suzy Purvis MD (electronically signed)  Emergency Physician          Suzy Purvis., MD  04/18/22 9233

## 2022-04-21 DIAGNOSIS — M79.605 LEG PAIN, BILATERAL: Primary | ICD-10-CM

## 2022-04-21 DIAGNOSIS — M79.604 LEG PAIN, BILATERAL: Primary | ICD-10-CM

## 2022-04-22 ENCOUNTER — HOSPITAL ENCOUNTER (OUTPATIENT)
Dept: NON INVASIVE DIAGNOSTICS | Age: 37
Discharge: HOME | End: 2022-04-22
Payer: OTHER GOVERNMENT

## 2022-04-22 DIAGNOSIS — Z72.0 TOBACCO ABUSE: ICD-10-CM

## 2022-04-22 DIAGNOSIS — I70.219 ATHEROSCLEROSIS WITH CLAUDICATION OF EXTREMITY (HCC): ICD-10-CM

## 2022-04-22 DIAGNOSIS — E78.2 MIXED HYPERLIPIDEMIA: ICD-10-CM

## 2022-04-22 DIAGNOSIS — R00.0 TACHYCARDIA: ICD-10-CM

## 2022-04-22 LAB
LV EF: 50 %
LV EF: 60 %
LVEF MODALITY: NORMAL
LVEF MODALITY: NORMAL

## 2022-04-22 PROCEDURE — 93350 STRESS TTE ONLY: CPT

## 2022-04-22 PROCEDURE — 93306 TTE W/DOPPLER COMPLETE: CPT

## 2022-04-22 RX ORDER — TRAMADOL HYDROCHLORIDE 50 MG/1
50 TABLET ORAL EVERY 8 HOURS PRN
Qty: 42 TABLET | Refills: 0 | Status: SHIPPED | OUTPATIENT
Start: 2022-04-22 | End: 2022-05-06

## 2022-04-27 ENCOUNTER — TELEPHONE (OUTPATIENT)
Dept: CARDIOLOGY CLINIC | Age: 37
End: 2022-04-27

## 2022-04-27 RX ORDER — METOPROLOL SUCCINATE 25 MG/1
25 TABLET, EXTENDED RELEASE ORAL NIGHTLY
Qty: 30 TABLET | Refills: 3 | Status: SHIPPED | OUTPATIENT
Start: 2022-04-27 | End: 2022-05-04 | Stop reason: ALTCHOICE

## 2022-04-27 NOTE — TELEPHONE ENCOUNTER
----- Message from ARGENTINA Miles sent at 4/26/2022  5:07 PM CDT -----  Negative stress test.  No evidence of any blockage.   Also had echo and MATIAS

## 2022-04-27 NOTE — TELEPHONE ENCOUNTER
----- Message from ARGENTINA Medina sent at 4/26/2022  5:06 PM CDT -----  Please let patient know that his monitor did show normal sinus rhythm but he did have 4 episodes where his heart rate got fast  With as fast as a guide I would recommend him starting on a low-dose beta-blocker. Would recommend starting Toprol 25 mg nightly. We can send it into his local pharmacy of choice.   We will keep follow-up that he has next month

## 2022-05-04 ENCOUNTER — OFFICE VISIT (OUTPATIENT)
Dept: CARDIOLOGY CLINIC | Age: 37
End: 2022-05-04
Payer: OTHER GOVERNMENT

## 2022-05-04 VITALS
DIASTOLIC BLOOD PRESSURE: 78 MMHG | HEIGHT: 70 IN | WEIGHT: 210 LBS | HEART RATE: 95 BPM | SYSTOLIC BLOOD PRESSURE: 118 MMHG | BODY MASS INDEX: 30.06 KG/M2

## 2022-05-04 DIAGNOSIS — I70.219 ATHEROSCLEROSIS WITH CLAUDICATION OF EXTREMITY (HCC): Primary | ICD-10-CM

## 2022-05-04 DIAGNOSIS — Z72.0 TOBACCO ABUSE: ICD-10-CM

## 2022-05-04 DIAGNOSIS — R00.0 TACHYCARDIA: ICD-10-CM

## 2022-05-04 DIAGNOSIS — E78.5 HYPERLIPIDEMIA, UNSPECIFIED HYPERLIPIDEMIA TYPE: Primary | ICD-10-CM

## 2022-05-04 PROCEDURE — 99214 OFFICE O/P EST MOD 30 MIN: CPT | Performed by: CLINICAL NURSE SPECIALIST

## 2022-05-04 RX ORDER — DILTIAZEM HYDROCHLORIDE 120 MG/1
120 CAPSULE, COATED, EXTENDED RELEASE ORAL DAILY
Qty: 30 CAPSULE | Refills: 5 | Status: SHIPPED | OUTPATIENT
Start: 2022-05-04 | End: 2022-06-08 | Stop reason: SDUPTHER

## 2022-05-04 RX ORDER — ALPRAZOLAM 0.5 MG/1
0.5 TABLET ORAL PRN
COMMUNITY
Start: 2022-04-06

## 2022-05-04 NOTE — PROGRESS NOTES
[facility]  [facility] [address]  [address]  Phone: [phone]  Fax: [phone]    OFFICE VISIT:  2022    Nyndaeem Tituss - : 1985    Reason For Visit:  Migue Heck is a 39 y.o. male who is here for Follow-up (no cardiac symptoms) and Other (PVD)  Patient was seen last month initial consultation for cardiac source of possible peripheral emboli. 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 Having episodes of anxiety and racing heart. He was started on anticoagulation. Monitor worn for 2 weeks showing normal sinus rhythm with average heart 87. He had 4 episodes of SVT lasting 5-7 beats but they were quite fast at 266 bpm.  Rare ectopy. Symptoms were associated with sinus rhythm and sinus tach    Patient is here today in follow-up. He states he was reluctant to take the metoprolol as he read up on it and it causes vasoconstriction. He is very concerned creased blood flow to his feet as he is already having issues with that. Still has pain with walking. Feels like his elevated heart rate and increase in anxiety was much worse after starting cholesterol medicine and blood thinner. Subjective  Augie denies exertional chest pain, shortness of breath, orthopnea, paroxysmal nocturnal dyspnea, syncope, presyncope, edema and fatigue. The patient denies numbness or weakness to suggest cerebrovascular accident or transient ischemic attack. ARGENTINA Lazo CNP is PCP and follows labs .   Nynadeem Marisa has the following history as recorded in [facility]:    Patient Active Problem List    Diagnosis Date Noted    Leg pain, bilateral     Atherosclerosis with claudication of extremity ([address] 2022     Past Medical History:   Diagnosis Date    Arthritis     COPD (chronic obstructive pulmonary disease) ([address]     PVD (peripheral vascular disease) ([address]      Past Surgical History: Procedure Laterality Date    BACK SURGERY      KNEE SURGERY Bilateral     VASCULAR SURGERY  2022    SJS. 1. Percutaneous ultrasound guided cannulation right common femoral artery with 5 french sheath. 2. Suprarenal abdominal aortogram with bilateral iliofemoral arteriogram and bilateral lower extremity arteriogram 3. Thoracic aortogram as well as supra renal abdominal aortogram 4. Selective left lower extremity arteriogram with the tip of the catheter in the left popliteal artery    VASCULAR SURGERY  2022    cont SJS. 5. Minx closure right common femoral artery puncture site    VASECTOMY       Family History   Problem Relation Age of Onset    Hypertension Father     Stroke Maternal Grandmother     Stroke Maternal Aunt      Social History     Tobacco Use    Smoking status: Former Smoker     Packs/day: 1.00     Quit date: 3/30/2022     Years since quittin.0    Smokeless tobacco: Former User     Types: Snuff    Tobacco comment: smoked  20 years   Substance Use Topics    Alcohol use: Yes     Comment: wine occassionally      Current Outpatient Medications   Medication Sig Dispense Refill    metoprolol succinate (TOPROL XL) 25 MG extended release tablet Take 1 tablet by mouth at bedtime (Patient not taking: Reported on 2022) 30 tablet 3    traMADol (ULTRAM) 50 MG tablet Take 1 tablet by mouth every 8 hours as needed for Pain for up to 14 days. 42 tablet 0    rivaroxaban (XARELTO) 20 MG TABS tablet Take 1 tablet by mouth Daily with supper 30 tablet 5    meclizine (ANTIVERT) 25 MG tablet Take one tab q6h prn not to exceed 100 mg in a 24 hour period 30 tablet 0    silver sulfADIAZINE (SILVADENE) 1 % cream Apply topically daily to Left Great Toe 20 g 0    rosuvastatin (CRESTOR) 20 MG tablet Take 1 tablet by mouth nightly 30 tablet 3    ALPRAZolam (XANAX) 0.5 MG tablet Take 0.5 mg by mouth as needed. No current facility-administered medications for this visit.      Allergies: Sulfa antibiotics    Review of Systems  Constitutional  no significant activity change, appetite change, or unexpected weight change. No fever, chills or diaphoresis. No fatigue. HEENT  no significant rhinorrhea or epistaxis. No tinnitus or significant hearing loss. Eyes  no sudden vision change or amaurosis. Respiratory  no significant wheezing, stridor, apnea or cough. No dyspnea on exertion or shortness of breath. Cardiovascular  no exertional chest pain, orthopnea or PND. No sensation of arrhythmia or slow heart rate. No claudication or leg edema. Gastrointestinal  no abdominal swelling or pain. No blood in stool. No severe constipation, diarrhea, nausea, or vomiting. Genitourinary  no difficulty urinating, dysuria, frequency, or urgency. No flank pain or hematuria. Musculoskeletal  no back pain, gait disturbance, or myalgia. Skin  no color change or rash. No pallor. No new surgical incision. Neurologic  no speech difficulty, facial asymmetry or lateralizing weakness. No seizures, presyncope, syncope, or significant dizziness. Hematologic  no easy bruising or excessive bleeding. Psychiatric  no severe anxiety or insomnia. No confusion. All other review of systems are negative. Objective  Vital Signs - Ht 5' 10\" (1.778 m)   Wt 210 lb (95.3 kg)   BMI 30.13 kg/m²   General - [facility] is alert, cooperative, and pleasant. Well groomed. No acute distress. Body habitus is normal.  HEENT  The head is normocephalic. No circumoral cyanosis. Dentition is normal.   EYES -  No Xanthelasma, no arcus senilis, no conjunctival hemorrhages or discharge. Neck - Supple, without increased jugular venous pressures. No carotid bruits. No mass. Respiratory - Lungs are clear bilaterally. No wheezes or rales. Normal effort without use of accessory muscles. Cardiovascular  Heart has regular rhythm and tachycardic rate. No murmurs, rubs or gallops.     + pedal pulses and no varicosities. Abdominal -  Soft, nontender, nondistended. Bowel sounds are intact. Extremities - No clubbing, cyanosis, or  edema. Musculoskeletal -  No clubbing . No Osler's nodes. Gait normal .  No kyphosis or scoliosis. Skin -  no statis ulcers or dermatitis. Neurological - No focal signs are identified. Oriented to person, place and time. Psychiatric -  Appropriate affect and mood. Assessment:     Diagnosis Orders   1. Atherosclerosis with claudication of extremity ([address]     2. Tobacco abuse     3. Tachycardia       Data:  BP Readings from Last 3 Encounters:   04/18/22 132/85   04/06/22 116/82   03/17/22 136/82    Pulse Readings from Last 3 Encounters:   04/18/22 105   04/06/22 70   03/17/22 110        Wt Readings from Last 3 Encounters:   05/04/22 210 lb (95.3 kg)   04/18/22 207 lb (93.9 kg)   04/06/22 206 lb (93.4 kg)         2-week monitor showed normal sinus rhythm 491 85 with average heart rate 87.  4 SVT episodes lasting 5-7 beats. Fastest was 266 bpm.  Rare ectopy. Symptoms were associated with sinus rhythm and sinus tachycardia  Very fast rates look like a reentrant tachycardia. -Reviewed with  and agrees    After reviewing monitor recommended starting Toprol 25 mg nightly. Start beta-blocker due to peripheral constriction. We discussed possibility of elevated heart rate increasing or causing his anxiety and not necessarily the heart rate elevated because of anxiety    We will try starting him on Cardizem and see if that helps with rate. We also discussed the option of seeing electrophysiologist to see if SVT and need for medication or ablation    Still concerned with need to take anticoagulation and statin. We will follow-up with vascular surgery discussed this is they are the ones who initiated these medications.   Does have elevated cholesterol and would recommend staying on statin for now   Reviewed recent notes   Reviewed recent labs      Stable cardiovascular status. No evidence of overt heart failure, angina or dysrhythmia. 30 minutes were spent preparing, reviewing and seeing patient. All questions answered    Plan  Start Cardizem 120mg daily for heart rate  Possible referral to EP   Follow up in 1 mos   Call with any questions or concerns  Follow up with ARGENTINA Wilson - CNP for non cardiac problems  Report any new problems  Cardiovascular Fitness-Exercise as tolerated. Strive for 30 minutes of exercise most days of the week. Cardiac / Healthy Diet  Continue current medications as directed  Continue plan of treatment  It is always recommended that you bring your medications bottles with you to each visit - this is for your safety! ARGENTINA Dickens    EMR dragon/transcription disclaimer: Much of this encounter note is electronic transcription/translation of spoken language to printed tach. Electronic translation of spoken language may be erroneous, or at times, nonsensical words or phrases may be inadvertently transcribed.  Although, I have reviewed the note for such errors, some may still exist.

## 2022-05-09 ENCOUNTER — TELEPHONE (OUTPATIENT)
Dept: VASCULAR SURGERY | Age: 37
End: 2022-05-09

## 2022-05-09 ENCOUNTER — SCHEDULED TELEPHONE ENCOUNTER (OUTPATIENT)
Dept: VASCULAR SURGERY | Age: 37
End: 2022-05-09
Payer: OTHER GOVERNMENT

## 2022-05-09 DIAGNOSIS — I70.213 ATHEROSCLEROSIS OF NATIVE ARTERY OF BOTH LOWER EXTREMITIES WITH INTERMITTENT CLAUDICATION (HCC): Primary | ICD-10-CM

## 2022-05-09 PROCEDURE — 99442 PR PHYS/QHP TELEPHONE EVALUATION 11-20 MIN: CPT | Performed by: PHYSICIAN ASSISTANT

## 2022-05-09 NOTE — TELEPHONE ENCOUNTER
Spoke with patient to let him know his epi this afternoon will be via telephone. Patient acknowledged.

## 2022-05-09 NOTE — TELEPHONE ENCOUNTER
Da larson wants to know if appt today with augustin can be moved to morning or change to phone call? Please call pt to advise.

## 2022-05-09 NOTE — PROGRESS NOTES
Camilo Pinto is a 39 y.o. male evaluated via telephone on 5/9/2022 for follow-up hyperlipidemia. Documentation:  I communicated with the patient and/or health care decision maker about results of fasting lipid panel. Details of this discussion including any medical advice provided: Mr. Amy May is a 48-year-old male who underwent a angiogram on 3/3/2022 by Dr. Fermin Hernández for lifestyle limiting claudication with left great toe wound. He reports that for the last 2 days he has not taken the Xarelto and does not plan to do so  unless he absolutely has to, as he reports this makes him feel \"really bad\". He is on Crestor 20 mg daily. He denies any severe myalgias/arthralgias. He reports that his feet hurt constantly. He reports that he has seen Cardiology and had a negative stress test.  He reports that he did have 4 episodes of tachycardia on his Zio patch. He has since been placed on Toprol 25 mg. He reports that since then the episodes of anxiety and palpatations have decreased. He reports that he has only had 1 episode which was less severe than previous episodes. Camilo Pinto is a 39 y.o. male with the following history as recorded in [facility]:  Patient Active Problem List    Diagnosis Date Noted    Leg pain, bilateral     Atherosclerosis with claudication of extremity ([facility] [address] 03/03/2022     Current Outpatient Medications   Medication Sig Dispense Refill    ALPRAZolam (XANAX) 0.5 MG tablet Take 0.5 mg by mouth as needed.       dilTIAZem (CARDIZEM CD) 120 MG extended release capsule Take 1 capsule by mouth daily 30 capsule 5    rivaroxaban (XARELTO) 20 MG TABS tablet Take 1 tablet by mouth Daily with supper 30 tablet 5    meclizine (ANTIVERT) 25 MG tablet Take one tab q6h prn not to exceed 100 mg in a 24 hour period 30 tablet 0    silver sulfADIAZINE (SILVADENE) 1 % cream Apply topically daily to Left Great Toe 20 g 0    rosuvastatin (CRESTOR) 20 MG tablet Take 1 tablet by mouth nightly 30 tablet 3     No current facility-administered medications for this visit. Allergies: Sulfa antibiotics  Past Medical History:   Diagnosis Date    Arthritis     COPD [facility]chronic obstructive pulmonary disease) ([facility] [address]     PVD (peripheral vascular disease) ([facility] [address]      Past Surgical History:   Procedure Laterality Date    BACK SURGERY      KNEE SURGERY Bilateral     VASCULAR SURGERY  2022    SJS. 1. Percutaneous ultrasound guided cannulation right common femoral artery with 5 Ethiopian sheath. 2. Suprarenal abdominal aortogram with bilateral iliofemoral arteriogram and bilateral lower extremity arteriogram 3. Thoracic aortogram as well as supra renal abdominal aortogram 4. Selective left lower extremity arteriogram with the tip of the catheter in the left popliteal artery    VASCULAR SURGERY  2022    cont SJS. 5. Minx closure right common femoral artery puncture site    VASECTOMY       Family History   Problem Relation Age of Onset    Hypertension Father     Stroke Maternal Grandmother     Stroke Maternal Aunt      Social History     Tobacco Use    Smoking status: Former Smoker     Packs/day: 1.00     Quit date: 3/30/2022     Years since quittin.1    Smokeless tobacco: Former User     Types: Snuff    Tobacco comment: smoked  20 years   Substance Use Topics    Alcohol use: Yes     Comment: wine occassionally       Review of Systems    Constitutional -   No fever or chills   HENT - no HA's, tinnitus, rhinorrhea, sore throat  Eyes - no sudden vision change or amaurosis. Respiratory - no SOB or chest pain  Cardiovascular - no chest pain, syncope, or significant dizziness. (See HPI)  Gastrointestinal - no significant abdominal pain. No blood in stool. No diarrhea, nausea, or vomiting. Genitourinary - No difficulty urinating, dysuria, frequency, or urgency. No flank pain or hematuria. Musculoskeletal - no back pain or myalgia.   Skin - no rashes or wounds   Neurologic - no dizziness, facial asymmetry, or light headedness. No seizures. No new onset of partial or complete loss of vision affecting only one eye, speech difficulty or lateralizing weakness, numbness/tingling   Hematologic - no excessive bleeding. Psychiatric -  No confusion. All other review of systems are negative. Physical Exam  Due to this being a TeleHealth encounter, evaluation of the following organ systems is limited:   Constitutional -  No acute distress. Neurologic - alert and oriented X 3. Psychiatric - mood, affect, and behavior appear normal.  Judgment and thought processes appear normal.      No components found for: CHLPL  Lab Results   Component Value Date    TRIG 148 03/24/2022     Lab Results   Component Value Date    HDL 38 (L) 03/24/2022       No results found for: LABVLDL    Lab Results   Component Value Date    ALT 64 (H) 04/18/2022    AST 27 04/18/2022    ALKPHOS 106 04/18/2022    BILITOT 0.3 04/18/2022         Fasting lipid panel -done 5/5/2022  Cholesterol 119  Triglycerides 98  HDL 40  LDL 79        Assessment -     1. Athersclerosis of bilateral LE native arteries with lifestyle limiting claudication        Plan -       Recommend he start aspirin 81 mg daily since he has stopped taking the Xarelto. Recommend he continue Crestor 20 mg daily. Recommend we repeat a hepatic function panel in 4 weeks for continued surveillance of slightly elevated ALT  I will discuss the case with Dr. Hussain Caruso and we will call the patient with further recommendations. Total Time: minutes: 11-20 minutes      Da Public was evaluated through a synchronous (real-time) audio encounter. Patient identification was verified at the start of the visit. He (or guardian if applicable) is aware that this is a billable service, which includes applicable co-pays. This visit was conducted with the patient's (and/or legal guardian's) verbal consent.  He has not had a related appointment within my department in the past 7 days or scheduled within the next 24 hours. The patient was located in a state where the provider was licensed to provide care.     Note: not billable if this call serves to triage the patient into an appointment for the relevant concern    Dimitris Hagan PA-C

## 2022-05-17 ENCOUNTER — TELEPHONE (OUTPATIENT)
Dept: VASCULAR SURGERY | Age: 37
End: 2022-05-17

## 2022-05-17 NOTE — TELEPHONE ENCOUNTER
I spoke with pt gave below information. pt voice understanding and aware.        ----- Message from Macie Ivory PA-C sent at 5/17/2022  7:41 AM CDT -----  Please call and let Mr. Evangelina Marte know that I did discuss the case with Dr. Jose Thomas. She reviewed everthing and agrees with Dr. Ned Gibson assessment. There is no vascular intervention that she feels like she could do at this time. She does recommend you stay on blood thinner. She also recommends you continue to follow-up with cardiology as needed. She stated if you would like a second opinion, we will be happy to make a referral for you.

## 2022-05-26 ENCOUNTER — OFFICE VISIT (OUTPATIENT)
Dept: CARDIOLOGY CLINIC | Age: 37
End: 2022-05-26
Payer: OTHER GOVERNMENT

## 2022-05-26 VITALS
WEIGHT: 207 LBS | OXYGEN SATURATION: 98 % | HEIGHT: 70 IN | DIASTOLIC BLOOD PRESSURE: 76 MMHG | HEART RATE: 81 BPM | BODY MASS INDEX: 29.63 KG/M2 | SYSTOLIC BLOOD PRESSURE: 120 MMHG

## 2022-05-26 DIAGNOSIS — Z01.818 PREOPERATIVE TESTING: ICD-10-CM

## 2022-05-26 DIAGNOSIS — I70.245 ATHSCL NATIVE ARTERIES OF LEFT LEG W ULCERATION OTH PRT FOOT (HCC): ICD-10-CM

## 2022-05-26 DIAGNOSIS — R00.0 TACHYCARDIA: Primary | ICD-10-CM

## 2022-05-26 DIAGNOSIS — E78.5 HYPERLIPIDEMIA, UNSPECIFIED HYPERLIPIDEMIA TYPE: ICD-10-CM

## 2022-05-26 DIAGNOSIS — Z01.818 PREOPERATIVE TESTING: Primary | ICD-10-CM

## 2022-05-26 LAB
ALBUMIN SERPL-MCNC: 5 G/DL (ref 3.5–5.2)
ALP BLD-CCNC: 127 U/L (ref 40–130)
ALT SERPL-CCNC: 49 U/L (ref 5–41)
ANION GAP SERPL CALCULATED.3IONS-SCNC: 13 MMOL/L (ref 7–19)
AST SERPL-CCNC: 22 U/L (ref 5–40)
BASOPHILS ABSOLUTE: 0 K/UL (ref 0–0.2)
BASOPHILS RELATIVE PERCENT: 0.4 % (ref 0–1)
BILIRUB SERPL-MCNC: 0.3 MG/DL (ref 0.2–1.2)
BILIRUBIN DIRECT: 0.1 MG/DL (ref 0–0.3)
BILIRUBIN, INDIRECT: 0.2 MG/DL (ref 0.1–1)
BUN BLDV-MCNC: 8 MG/DL (ref 6–20)
CALCIUM SERPL-MCNC: 9.8 MG/DL (ref 8.6–10)
CHLORIDE BLD-SCNC: 105 MMOL/L (ref 98–111)
CHOLESTEROL, TOTAL: 141 MG/DL (ref 160–199)
CO2: 27 MMOL/L (ref 22–29)
CREAT SERPL-MCNC: 0.8 MG/DL (ref 0.5–1.2)
EOSINOPHILS ABSOLUTE: 0.1 K/UL (ref 0–0.6)
EOSINOPHILS RELATIVE PERCENT: 1.3 % (ref 0–5)
GFR AFRICAN AMERICAN: >59
GFR NON-AFRICAN AMERICAN: >60
GLUCOSE BLD-MCNC: 87 MG/DL (ref 74–109)
HCT VFR BLD CALC: 48.4 % (ref 42–52)
HDLC SERPL-MCNC: 46 MG/DL (ref 55–121)
HEMOGLOBIN: 16.2 G/DL (ref 14–18)
IMMATURE GRANULOCYTES #: 0 K/UL
LDL CHOLESTEROL CALCULATED: 66 MG/DL
LYMPHOCYTES ABSOLUTE: 2.4 K/UL (ref 1.1–4.5)
LYMPHOCYTES RELATIVE PERCENT: 33.1 % (ref 20–40)
MCH RBC QN AUTO: 31.1 PG (ref 27–31)
MCHC RBC AUTO-ENTMCNC: 33.5 G/DL (ref 33–37)
MCV RBC AUTO: 92.9 FL (ref 80–94)
MONOCYTES ABSOLUTE: 0.4 K/UL (ref 0–0.9)
MONOCYTES RELATIVE PERCENT: 4.9 % (ref 0–10)
NEUTROPHILS ABSOLUTE: 4.3 K/UL (ref 1.5–7.5)
NEUTROPHILS RELATIVE PERCENT: 60.2 % (ref 50–65)
PDW BLD-RTO: 13 % (ref 11.5–14.5)
PLATELET # BLD: 361 K/UL (ref 130–400)
PMV BLD AUTO: 9.2 FL (ref 9.4–12.4)
POTASSIUM SERPL-SCNC: 4.5 MMOL/L (ref 3.5–5)
RBC # BLD: 5.21 M/UL (ref 4.7–6.1)
SODIUM BLD-SCNC: 145 MMOL/L (ref 136–145)
TOTAL PROTEIN: 7.7 G/DL (ref 6.6–8.7)
TRIGL SERPL-MCNC: 144 MG/DL (ref 0–149)
WBC # BLD: 7.2 K/UL (ref 4.8–10.8)

## 2022-05-26 PROCEDURE — 99205 OFFICE O/P NEW HI 60 MIN: CPT | Performed by: INTERNAL MEDICINE

## 2022-05-26 PROCEDURE — 93000 ELECTROCARDIOGRAM COMPLETE: CPT | Performed by: INTERNAL MEDICINE

## 2022-05-26 RX ORDER — ASPIRIN 81 MG/1
81 TABLET ORAL DAILY
COMMUNITY

## 2022-05-26 NOTE — PATIENT INSTRUCTIONS
Learning About Catheter Ablation for Heart Problems    What is catheter ablation? Catheter ablation is a procedure that treats heart rhythm problems. These problems include atrial fibrillation, supraventricular tachycardia (SVT), atrial flutter, and ventricular tachycardia. Your heart should have a strong, steady beat. That beat is controlled by the heart's electrical system. Sometimes that system misfires. This causes a heartbeat that is too fast and isn't steady. Catheter ablation is a way to get into your heart and fix the problem. Ablation is not surgery. How is catheter ablation done? Your doctor inserts thin tubes called catheters into a blood vessel in your groin, arm, or neck. Then your doctor feeds them into the heart. Wires in the catheters help the doctor find the problem areas. Then he or she uses the wires to send energy to destroy the tiny areas of heart tissue that are causing the problems. It may seem like a bad idea to destroy parts of your heart on purpose. But the areas that are destroyed are very tiny. They should not affect your heart's ability to do its job. You may be awake during the procedure. Or you may be asleep. The doctor will give you medicines to help you feel relaxed and to numb the areas where the catheters go in. You may feel a little uncomfortable, but you should not feel pain. This procedure usually takes 2 to 6 hours. In rare cases, it can take longer. You will stay overnight in the hospital. How long you stay in the hospital depends on the type of ablation you have. Most people can go back to work and their normal routine in 1 or 2 days. What can you expect after catheter ablation? You may have swelling, bruising, or a small lump around the site where the catheters went into your body. These should go away in 3 to 4 weeks. You may have to take some medicines for a while. Follow-up care is a key part of your treatment and safety.  Be sure to make and go to all appointments, and call your doctor if you are having problems. Instructions:   1. Stop you Cardizem on Sunday night. 2. Nothing to eat or drink after midnight. 3. You can still take your other medications the morning of with a sip of water. 4. Arrive at [facility] at 11:00 am on 6/1/22 to be registered. Procedure should start around 1:00PM.       Dr Edinson Zeng.  Inderjit  Electrophysiology and Cardiology  [id number] [facility] heart and vascular [place], cardiology  [phone]

## 2022-05-26 NOTE — PROGRESS NOTES
Da Pinto is a 39 y.o. male presents with tachycardia. The patient reports that he has had years of flushing symptoms but was never aware of the tachycardia. It frequently causes a headache. He had a lower extremity angiogram for severe peripheral atherosclerosis and was placed on a statin. While driving he suffered a near syncopal episode and by this time had purchased a pulse ox which revealed his heart rates to be in excess of 150 bpm.  He was short of breath and apparently had a negative work-up for pulmonary embolism in the emergency department. He did not receive adenosine but his heart rate resolved on its own. He started noticing every time he had the symptoms his pulses were very high. He wore an event monitor for a couple of weeks and that revealed frequent narrow complex tachycardias as fast as 170 with bursts up into the lower 200 bpm range. Become so fatigued when it is particularly fast that he cannot stand up. Review of Systems   Constitutional: Negative for fever, chills, diaphoresis, activity change, appetite change, fatigue and unexpected weight change. Eyes: Negative for photophobia, pain, redness and visual disturbance. Respiratory: Negative for apnea, cough, chest tightness, shortness of breath, wheezing and stridor. Cardiovascular: Negative for chest pain, palpitations and leg swelling. Gastrointestinal: Negative for abdominal distention. Genitourinary: Negative for dysuria, urgency and frequency. Musculoskeletal: Negative for myalgias, arthralgias and gait problem. Skin: Negative for color change, pallor, rash and wound. Neurological: Negative for dizziness, tremors, speech difficulty, weakness and numbness. Hematological: Does not bruise/bleed easily. Psychiatric/Behavioral: Negative.         Past Medical History:   Diagnosis Date    Arthritis     COPD (chronic obstructive pulmonary disease) ([facility] [address]     PVD (peripheral vascular disease) ([facility] [address] Past Surgical History:   Procedure Laterality Date    BACK SURGERY      KNEE SURGERY Bilateral     VASCULAR SURGERY  2022    SJS. 1. Percutaneous ultrasound guided cannulation right common femoral artery with 5 German sheath. 2. Suprarenal abdominal aortogram with bilateral iliofemoral arteriogram and bilateral lower extremity arteriogram 3. Thoracic aortogram as well as supra renal abdominal aortogram 4. Selective left lower extremity arteriogram with the tip of the catheter in the left popliteal artery    VASCULAR SURGERY  2022    cont SJS. 5. Minx closure right common femoral artery puncture site    VASECTOMY         Family History   Problem Relation Age of Onset    Hypertension Father     Stroke Maternal Grandmother     Stroke Maternal Aunt        Social History     Socioeconomic History    Marital status:      Spouse name: Not on file    Number of children: Not on file    Years of education: Not on file    Highest education level: Not on file   Occupational History    Not on file   Tobacco Use    Smoking status: Former Smoker     Packs/day: 1.00     Quit date: 3/30/2022     Years since quittin.1    Smokeless tobacco: Former User     Types: Snuff    Tobacco comment: smoked  20 years   Vaping Use    Vaping Use: Never used   Substance and Sexual Activity    Alcohol use: Yes     Comment: wine occassionally    Drug use: Never    Sexual activity: Not on file   Other Topics Concern    Not on file   Social History Narrative    Not on file     Social Determinants of Health     Financial Resource Strain:     Difficulty of Paying Living Expenses: Not on file   Food Insecurity:     Worried About [address] in the Last Year: Not on file    [address] in the Last Year: Not on file   Transportation Needs:     Lack of Transportation (Medical): Not on file    Lack of Transportation (Non-Medical):  Not on file   Physical Activity:     Days of Exercise per Week: Not on file    Minutes of Exercise per Session: Not on file   Stress:     Feeling of Stress : Not on file   Social Connections:     Frequency of Communication with Friends and Family: Not on file    Frequency of Social Gatherings with Friends and Family: Not on file    Attends [employer] Services: Not on file    Active Member of [address] [place] [address] or Organizations: Not on file    Attends Club or Organization Meetings: Not on file    Marital Status: Not on file   Intimate Partner Violence:     Fear of Current or Ex-Partner: Not on file    Emotionally Abused: Not on file    Physically Abused: Not on file    Sexually Abused: Not on file   Housing Stability:     Unable to Pay for Housing in the Last Year: Not on file    Number of [place] in the Last Year: Not on file    Unstable Housing in the Last Year: Not on file       Allergies   Allergen Reactions    Sulfa Antibiotics          Current Outpatient Medications:     aspirin 81 MG EC tablet, Take 81 mg by mouth daily, Disp: , Rfl:     ALPRAZolam (XANAX) 0.5 MG tablet, Take 0.5 mg by mouth as needed. , Disp: , Rfl:     dilTIAZem (CARDIZEM CD) 120 MG extended release capsule, Take 1 capsule by mouth daily, Disp: 30 capsule, Rfl: 5    meclizine (ANTIVERT) 25 MG tablet, Take one tab q6h prn not to exceed 100 mg in a 24 hour period, Disp: 30 tablet, Rfl: 0    rosuvastatin (CRESTOR) 20 MG tablet, Take 1 tablet by mouth nightly, Disp: 30 tablet, Rfl: 3    PE:  Vitals:    05/26/22 1050   BP: 120/76   Pulse: 81   SpO2: 98%   Weight: 207 lb (93.9 kg)   Height: 5' 10\" (1.778 m)       Estimated body mass index is 29.7 kg/m² as calculated from the following:    Height as of this encounter: 5' 10\" (1.778 m). Weight as of this encounter: 207 lb (93.9 kg). Constitutional: He is oriented to person, place, and time. He appears well-developed and well-nourished in no acute distress. Neck:  Neck supple without JVD present. No trachea deviation present.  No thyromegaly present. Eyes:Conjunctivae and EOM are normal. Pupils equal and reactive to light. ENT:Hearing appears normal.conjunctiva and lids are normal, ears and nose appear normal.  Cardiovascular: Normal rate, S1-S2 regular rhythm, normal heart sounds. No murmur ascultated. No gallop and no friction rub. No carotid bruits. No peripheral edema. Pulmonary/Chest:  Lungs clear to auscultation bilaterally without evidence of respiratory distress. He without wheezes. He without rales or ronchi. Musculoskeletal: Normal range of motion. Gait is normal no assitive device. Head is normocephalic and atraumatic. Skin: Skin is warm and dry without rash or pallor. Psychiatric:He is alert and oriented to person, place, and time. He has a normal mood and affect. His behavior is normal. Thought content normal.       Lab Results   Component Value Date    CREATININE 1.0 04/18/2022    CREATININE 0.9 03/24/2022    CREATININE 0.8 03/01/2022    HGB 14.0 04/18/2022    HGB 14.8 03/24/2022    HGB 15.6 03/01/2022    PROBNP 33 04/18/2022           ECG 05/26/22    Sinus rhythm with narrow complex no evidence of preexcitation otherwise normal    Thoracic echo and stress echo were normal.       Assessment, Recommendations, & Plan:  39 y.o. male with tachycardia. It is unclear whether this could be sinus tachycardia but it appears to be a mid RP tachycardia at its fastest rates. I would suggest electrophysiologic study with possible radiofrequency ablation if the tachycardia is found. He understands this could be sinus tachycardia but it would be unusual for the rates to be this fast.  He understands the risks and benefits including those of heart attack stroke and death and wishes to proceed. Disposition - RTC following the procedure      Please do not hesitate to contact me for any questions or concerns. Dr. Twin Garnett.  Inderjit  Electrophysiology and Cardiology  [facility] [place], Cardiology  [phone]

## 2022-05-31 ENCOUNTER — TELEPHONE (OUTPATIENT)
Dept: VASCULAR SURGERY | Age: 37
End: 2022-05-31

## 2022-05-31 DIAGNOSIS — I70.219 ATHEROSCLEROSIS WITH CLAUDICATION OF EXTREMITY (HCC): Primary | ICD-10-CM

## 2022-05-31 NOTE — TELEPHONE ENCOUNTER
Spoke with patient and made an appt for him to have CMP and follow up telephone visit with [facility] in 4 weeks. ----- Message from ARGENTINA Diane sent at 5/31/2022  6:49 AM CDT -----  [facility] asked for 4 week follow up with cmp.   Lipids okay  ----- Message -----  From: Arturo Tomas Incoming Lab Results From [email or web address]  Sent: 5/26/2022   1:29 PM CDT  To: Chris Mandel PA-C

## 2022-06-01 ENCOUNTER — HOSPITAL ENCOUNTER (OUTPATIENT)
Dept: CARDIAC CATH/INVASIVE PROCEDURES | Age: 37
Discharge: HOME | End: 2022-06-01
Attending: INTERNAL MEDICINE | Admitting: INTERNAL MEDICINE
Payer: OTHER GOVERNMENT

## 2022-06-01 VITALS
OXYGEN SATURATION: 98 % | WEIGHT: 207 LBS | HEIGHT: 70 IN | RESPIRATION RATE: 13 BRPM | SYSTOLIC BLOOD PRESSURE: 152 MMHG | TEMPERATURE: 97.8 F | DIASTOLIC BLOOD PRESSURE: 93 MMHG | HEART RATE: 81 BPM | BODY MASS INDEX: 29.63 KG/M2

## 2022-06-01 PROBLEM — R00.0 TACHYCARDIA: Status: ACTIVE | Noted: 2022-06-01

## 2022-06-01 PROCEDURE — 6360000002 HC RX W HCPCS

## 2022-06-01 PROCEDURE — 93621 COMP EP EVL L PAC&REC C SINS: CPT

## 2022-06-01 PROCEDURE — 93623 PRGRMD STIMJ&PACG IV RX NFS: CPT

## 2022-06-01 PROCEDURE — 93623 PRGRMD STIMJ&PACG IV RX NFS: CPT | Performed by: INTERNAL MEDICINE

## 2022-06-01 PROCEDURE — 93620 COMP EP EVL R AT VEN PAC&REC: CPT

## 2022-06-01 PROCEDURE — C1732 CATH, EP, DIAG/ABL, 3D/VECT: HCPCS

## 2022-06-01 PROCEDURE — 2709999900 HC NON-CHARGEABLE SUPPLY

## 2022-06-01 PROCEDURE — C1894 INTRO/SHEATH, NON-LASER: HCPCS

## 2022-06-01 PROCEDURE — 99153 MOD SED SAME PHYS/QHP EA: CPT

## 2022-06-01 PROCEDURE — 2500000003 HC RX 250 WO HCPCS

## 2022-06-01 PROCEDURE — 93621 COMP EP EVL L PAC&REC C SINS: CPT | Performed by: INTERNAL MEDICINE

## 2022-06-01 PROCEDURE — C1730 CATH, EP, 19 OR FEW ELECT: HCPCS

## 2022-06-01 PROCEDURE — 2720000010 HC SURG SUPPLY STERILE

## 2022-06-01 PROCEDURE — 93620 COMP EP EVL R AT VEN PAC&REC: CPT | Performed by: INTERNAL MEDICINE

## 2022-06-01 PROCEDURE — 99152 MOD SED SAME PHYS/QHP 5/>YRS: CPT

## 2022-06-01 PROCEDURE — 2580000003 HC RX 258: Performed by: INTERNAL MEDICINE

## 2022-06-01 RX ORDER — TRAMADOL HYDROCHLORIDE 50 MG/1
50 TABLET ORAL EVERY 6 HOURS PRN
COMMUNITY

## 2022-06-01 RX ORDER — ACETAMINOPHEN 325 MG/1
650 TABLET ORAL EVERY 4 HOURS PRN
Status: DISCONTINUED | OUTPATIENT
Start: 2022-06-01 | End: 2022-06-01 | Stop reason: HOSPADM

## 2022-06-01 RX ORDER — SODIUM CHLORIDE 9 MG/ML
INJECTION, SOLUTION INTRAVENOUS PRN
Status: DISCONTINUED | OUTPATIENT
Start: 2022-06-01 | End: 2022-06-01 | Stop reason: HOSPADM

## 2022-06-01 RX ORDER — SODIUM CHLORIDE 0.9 % (FLUSH) 0.9 %
5-40 SYRINGE (ML) INJECTION PRN
Status: DISCONTINUED | OUTPATIENT
Start: 2022-06-01 | End: 2022-06-01 | Stop reason: HOSPADM

## 2022-06-01 RX ORDER — SODIUM CHLORIDE 0.9 % (FLUSH) 0.9 %
5-40 SYRINGE (ML) INJECTION EVERY 12 HOURS SCHEDULED
Status: DISCONTINUED | OUTPATIENT
Start: 2022-06-01 | End: 2022-06-01 | Stop reason: HOSPADM

## 2022-06-01 RX ADMIN — SODIUM CHLORIDE: 9 INJECTION, SOLUTION INTRAVENOUS at 15:26

## 2022-06-01 NOTE — OP NOTE
Operative Note      Patient: Antia Thomas  YOB: 1985  MRN: [id number]    Date of Procedure:     Pre-Op Diagnosis: Electrophysiologic study     Post-Op Diagnosis: Inappropriate sinus tachycardia           Diagnostic electrophysiologic study      Estimated Blood Loss (mL): <70HX    Complications: none        HV interval 61ms  Findings: VA Wenckebach 250ms       AV Wenckebach 270ms    AV node effective refractory period 600/260/320/290   No inducible arrhythmias even on dobutamine         Detailed Description of Procedure:   MD arrived at 12:51 PM and conscious sedation was instituted. The patient was continuously monitored by the trained experienced nurse under my supervision with respect to level of consciousness and vital signs/physiologic status throughout the case. For further details regarding specific medications and dosage please refer to Cath Lab procedural notes. The patient was prepped and draped in sterile fashion 2% lidocaine and benzocaine mix were used to anesthetize both groins. Right femoral vein was accessed via Seldinger technique and a wire was placed in the vein this was repeated on the left with 2 wires each. A 7 Western [place] and a 5 Western Ira sheath were placed on the right a 6 Western [place] sheath and 5 Setswana sheath were placed in the left all sheaths were flushed. A decapolar catheter was then placed distally in the coronary sinus left atrial left ventricular electrograms were recorded. 2 quadripolar catheters were placed in the his bundle in the high right atrium and a 4 mm ablation catheters placed in the RV apex under fluoroscopic guidance. Ventricular pacing was then instituted and VA conduction was concentric and decremental.  Ventricular ramps were performed and VA Wenckebach was recorded. No arrhythmias were induced. No sign of lateral concealed pathway. High right atrial pacing was undertaken and burst ramp and programmed electrical stimulation fashion.   No arrhythmias were induced. Rapid atrial beats were not seen. No AH jump was seen and AV Wenckebach was recorded. Dobutamine was then initiated and titrated to 10 mcg/kg/min and the entire process was repeated. No arrhythmias were induced. Along the procedure all catheters were removed the sheath removed and hemostasis was achieved by direct pressure. The patient tolerated the procedure well and was taken to telemetry for observation. Findings:    1) no arrhythmias were induced  2) long RP tachycardia likely sinus tachycardia with rapid bursts intermittent.   We will plan to treat with beta-blockade  3) has normal conduction system    Electronically signed by Sean Platt MD on 6/1/2022 at 2:37 PM

## 2022-06-01 NOTE — PROGRESS NOTES
Discharge instructions reviewed with patient and patient states understanding.  Patient discharged from cath holding with all belongings and AVS.  Electronically signed by Rafael Jimenes RN on 6/1/2022 at 6:15 PM

## 2022-06-08 ENCOUNTER — OFFICE VISIT (OUTPATIENT)
Dept: CARDIOLOGY CLINIC | Age: 37
End: 2022-06-08
Payer: OTHER GOVERNMENT

## 2022-06-08 VITALS
SYSTOLIC BLOOD PRESSURE: 122 MMHG | HEIGHT: 70 IN | OXYGEN SATURATION: 98 % | WEIGHT: 208 LBS | HEART RATE: 75 BPM | DIASTOLIC BLOOD PRESSURE: 76 MMHG | BODY MASS INDEX: 29.78 KG/M2

## 2022-06-08 DIAGNOSIS — R00.0 TACHYCARDIA: Primary | ICD-10-CM

## 2022-06-08 PROCEDURE — 99213 OFFICE O/P EST LOW 20 MIN: CPT | Performed by: INTERNAL MEDICINE

## 2022-06-08 RX ORDER — DILTIAZEM HYDROCHLORIDE 120 MG/1
120 CAPSULE, COATED, EXTENDED RELEASE ORAL DAILY
Qty: 30 CAPSULE | Refills: 5 | Status: SHIPPED | OUTPATIENT
Start: 2022-06-08

## 2022-06-08 NOTE — H&P
Kiera Ritchie is a [facility] male presents with tachycardia. The patient reports that he has had years of flushing symptoms but was never aware of the tachycardia. It frequently causes a headache. He had a lower extremity angiogram for severe peripheral atherosclerosis and was placed on a statin. While driving he suffered a near syncopal episode and by this time had purchased a pulse ox which revealed his heart rates to be in excess of 150 bpm.  He was short of breath and apparently had a negative work-up for pulmonary embolism in the emergency department. He did not receive adenosine but his heart rate resolved on its own. He started noticing every time he had the symptoms his pulses were very high. He wore an event monitor for a couple of weeks and that revealed frequent narrow complex tachycardias as fast as 170 with bursts up into the lower 200 bpm range. Become so fatigued when it is particularly fast that he cannot stand up. Review of Systems   Constitutional: Negative for fever, chills, diaphoresis, activity change, appetite change, fatigue and unexpected weight change. Eyes: Negative for photophobia, pain, redness and visual disturbance. Respiratory: Negative for apnea, cough, chest tightness, shortness of breath, wheezing and stridor. Cardiovascular: Negative for chest pain, palpitations and leg swelling. Gastrointestinal: Negative for abdominal distention. Genitourinary: Negative for dysuria, urgency and frequency. Musculoskeletal: Negative for myalgias, arthralgias and gait problem. Skin: Negative for color change, pallor, rash and wound. Neurological: Negative for dizziness, tremors, speech difficulty, weakness and numbness. Hematological: Does not bruise/bleed easily. Psychiatric/Behavioral: Negative.         Past Medical History:   Diagnosis Date    Arthritis     COPD [facility]chronic obstructive pulmonary disease) ([facility] [address]     PVD (peripheral vascular disease) ([facility] [address] Past Surgical History:   Procedure Laterality Date    BACK SURGERY      KNEE SURGERY Bilateral     VASCULAR SURGERY  2022    SJS. 1. Percutaneous ultrasound guided cannulation right common femoral artery with 5 Sinhala sheath. 2. Suprarenal abdominal aortogram with bilateral iliofemoral arteriogram and bilateral lower extremity arteriogram 3. Thoracic aortogram as well as supra renal abdominal aortogram 4. Selective left lower extremity arteriogram with the tip of the catheter in the left popliteal artery    VASCULAR SURGERY  2022    cont SJS. 5. Minx closure right common femoral artery puncture site    VASECTOMY         Family History   Problem Relation Age of Onset    Hypertension Father     Stroke Maternal Grandmother     Stroke Maternal Aunt        Social History     Socioeconomic History    Marital status:      Spouse name: Not on file    Number of children: Not on file    Years of education: Not on file    Highest education level: Not on file   Occupational History    Not on file   Tobacco Use    Smoking status: Current Some Day Smoker     Packs/day: 1.00     Last attempt to quit: 3/30/2022     Years since quittin.1    Smokeless tobacco: Former User     Types: Snuff    Tobacco comment: occasionally   Vaping Use    Vaping Use: Never used   Substance and Sexual Activity    Alcohol use: Yes     Comment: wine occassionally    Drug use: Never    Sexual activity: Not on file   Other Topics Concern    Not on file   Social History Narrative    Not on file     Social Determinants of Health     Financial Resource Strain:     Difficulty of Paying Living Expenses: Not on file   Food Insecurity:     Worried About [address] in the Last Year: Not on file    [address] in the Last Year: Not on file   Transportation Needs:     Lack of Transportation (Medical): Not on file    Lack of Transportation (Non-Medical):  Not on file   Physical Activity:     Days of Exercise per Week: Not on file    Minutes of Exercise per Session: Not on file   Stress:     Feeling of Stress : Not on file   Social Connections:     Frequency of Communication with Friends and Family: Not on file    Frequency of Social Gatherings with Friends and Family: Not on file    Attends Restorationist Services: Not on file    Active Member of [address] [place] [address] or Organizations: Not on file    Attends Club or Organization Meetings: Not on file    Marital Status: Not on file   Intimate Partner Violence:     Fear of Current or Ex-Partner: Not on file    Emotionally Abused: Not on file    Physically Abused: Not on file    Sexually Abused: Not on file   Housing Stability:     Unable to Pay for Housing in the Last Year: Not on file    Number of [place] in the Last Year: Not on file    Unstable Housing in the Last Year: Not on file       Allergies   Allergen Reactions    Sulfa Antibiotics          Current Outpatient Medications:     traMADol (ULTRAM) 50 MG tablet, Take 50 mg by mouth every 6 hours as needed for Pain., Disp: , Rfl:     aspirin 81 MG EC tablet, Take 81 mg by mouth daily, Disp: , Rfl:     ALPRAZolam (XANAX) 0.5 MG tablet, Take 0.5 mg by mouth as needed. , Disp: , Rfl:     dilTIAZem (CARDIZEM CD) 120 MG extended release capsule, Take 1 capsule by mouth daily, Disp: 30 capsule, Rfl: 5    meclizine (ANTIVERT) 25 MG tablet, Take one tab q6h prn not to exceed 100 mg in a 24 hour period, Disp: 30 tablet, Rfl: 0    rosuvastatin (CRESTOR) 20 MG tablet, Take 1 tablet by mouth nightly, Disp: 30 tablet, Rfl: 3    PE:  Vitals:    06/01/22 1630 06/01/22 1700 06/01/22 1730 06/01/22 1800   BP: (!) 141/97 (!) 144/95 (!) 141/89 (!) 152/93   Pulse: (!) 101 97 88 81   Resp: 15 17 17 13   Temp:       TempSrc:       SpO2: 97% 97% 95% 98%   Weight:       Height:           Estimated body mass index is 29.7 kg/m² as calculated from the following:    Height as of this encounter: 5' 10\" (1.778 m).     Weight as of this encounter: 207 lb (93.9 kg). Constitutional: He is oriented to person, place, and time. He appears well-developed and well-nourished in no acute distress. Neck:  Neck supple without JVD present. No trachea deviation present. No thyromegaly present. Eyes:Conjunctivae and EOM are normal. Pupils equal and reactive to light. ENT:Hearing appears normal.conjunctiva and lids are normal, ears and nose appear normal.  Cardiovascular: Normal rate, S1-S2 regular rhythm, normal heart sounds. No murmur ascultated. No gallop and no friction rub. No carotid bruits. No peripheral edema. Pulmonary/Chest:  Lungs clear to auscultation bilaterally without evidence of respiratory distress. He without wheezes. He without rales or ronchi. Musculoskeletal: Normal range of motion. Gait is normal no assitive device. Head is normocephalic and atraumatic. Skin: Skin is warm and dry without rash or pallor. Psychiatric:He is alert and oriented to person, place, and time. He has a normal mood and affect. His behavior is normal. Thought content normal.       Lab Results   Component Value Date    CREATININE 0.8 05/26/2022    CREATININE 1.0 04/18/2022    CREATININE 0.9 03/24/2022    HGB 16.2 05/26/2022    HGB 14.0 04/18/2022    HGB 14.8 03/24/2022    PROBNP 33 04/18/2022           ECG 06/08/22    Sinus rhythm with narrow complex no evidence of preexcitation otherwise normal    Thoracic echo and stress echo were normal.       Assessment, Recommendations, & Plan:  39 y.o. male with tachycardia. It is unclear whether this could be sinus tachycardia but it appears to be a mid RP tachycardia at its fastest rates. I would suggest electrophysiologic study with possible radiofrequency ablation if the tachycardia is found.   He understands this could be sinus tachycardia but it would be unusual for the rates to be this fast.  He understands the risks and benefits including those of heart attack stroke and death and wishes to proceed. Disposition - RTC following the procedure      Please do not hesitate to contact me for any questions or concerns. Dr. Bret Lopez.  [place]  Electrophysiology and Cardiology  [facility] [facility] [place], Cardiology  [phone]

## 2022-06-08 NOTE — PROGRESS NOTES
Almas Samuels is a 39 y.o. male presents with appropriate sinus tachycardia. He underwent diagnostic electrophysiologic study last week and no arrhythmia genic focus was found. He is feeling great on the diltiazem and having no further rapid rates. He does not want to try beta-blocker because of his peripheral vascular disease. Review of Systems   Constitutional: Negative for fever, chills, diaphoresis, activity change, appetite change, fatigue and unexpected weight change. Eyes: Negative for photophobia, pain, redness and visual disturbance. Respiratory: Negative for apnea, cough, chest tightness, shortness of breath, wheezing and stridor. Cardiovascular: Negative for chest pain, palpitations and leg swelling. Gastrointestinal: Negative for abdominal distention. Genitourinary: Negative for dysuria, urgency and frequency. Musculoskeletal: Negative for myalgias, arthralgias and gait problem. Skin: Negative for color change, pallor, rash and wound. Neurological: Negative for dizziness, tremors, speech difficulty, weakness and numbness. Hematological: Does not bruise/bleed easily. Psychiatric/Behavioral: Negative.           Social History     Socioeconomic History    Marital status:      Spouse name: Not on file    Number of children: Not on file    Years of education: Not on file    Highest education level: Not on file   Occupational History    Not on file   Tobacco Use    Smoking status: Current Some Day Smoker     Packs/day: 1.00     Last attempt to quit: 3/30/2022     Years since quittin.1    Smokeless tobacco: Former User     Types: Snuff    Tobacco comment: occasionally   Vaping Use    Vaping Use: Never used   Substance and Sexual Activity    Alcohol use: Yes     Comment: wine occassionally    Drug use: Never    Sexual activity: Not on file   Other Topics Concern    Not on file   Social History Narrative    Not on file     Social Determinants of Health Financial Resource Strain:     Difficulty of Paying Living Expenses: Not on file   Food Insecurity:     Worried About Running Out of Food in the Last Year: Not on file    Alvin of Food in the Last Year: Not on file   Transportation Needs:     Lack of Transportation (Medical): Not on file    Lack of Transportation (Non-Medical): Not on file   Physical Activity:     Days of Exercise per Week: Not on file    Minutes of Exercise per Session: Not on file   Stress:     Feeling of Stress : Not on file   Social Connections:     Frequency of Communication with Friends and Family: Not on file    Frequency of Social Gatherings with Friends and Family: Not on file    Attends Evangelical Services: Not on file    Active Member of [address] [place] [address] or Organizations: Not on file    Attends Club or Organization Meetings: Not on file    Marital Status: Not on file   Intimate Partner Violence:     Fear of Current or Ex-Partner: Not on file    Emotionally Abused: Not on file    Physically Abused: Not on file    Sexually Abused: Not on file   Housing Stability:     Unable to Pay for Housing in the Last Year: Not on file    Number of [place] in the Last Year: Not on file    Unstable Housing in the Last Year: Not on file       Allergies   Allergen Reactions    Sulfa Antibiotics          Current Outpatient Medications:     dilTIAZem (CARDIZEM CD) 120 MG extended release capsule, Take 1 capsule by mouth daily, Disp: 30 capsule, Rfl: 5    traMADol (ULTRAM) 50 MG tablet, Take 50 mg by mouth every 6 hours as needed for Pain., Disp: , Rfl:     aspirin 81 MG EC tablet, Take 81 mg by mouth daily, Disp: , Rfl:     ALPRAZolam (XANAX) 0.5 MG tablet, Take 0.5 mg by mouth as needed. , Disp: , Rfl:     meclizine (ANTIVERT) 25 MG tablet, Take one tab q6h prn not to exceed 100 mg in a 24 hour period, Disp: 30 tablet, Rfl: 0    rosuvastatin (CRESTOR) 20 MG tablet, Take 1 tablet by mouth nightly, Disp: 30 tablet, Rfl: 3    PE:  Vitals:    06/08/22 1028   BP: 122/76   Pulse: 75   SpO2: 98%   Weight: 208 lb (94.3 kg)   Height: 5' 10\" (1.778 m)       Estimated body mass index is 29.84 kg/m² as calculated from the following:    Height as of this encounter: 5' 10\" (1.778 m). Weight as of this encounter: 208 lb (94.3 kg). Constitutional: He is oriented to person, place, and time. He appears well-developed and well-nourished in no acute distress. Neck:  Neck supple without JVD present. No trachea deviation present. No thyromegaly present. Eyes:Conjunctivae and EOM are normal. Pupils equal and reactive to light. ENT:Hearing appears normal.conjunctiva and lids are normal, ears and nose appear normal.  Cardiovascular: Normal rate, S1-S2 regular rhythm, normal heart sounds. No murmur ascultated. No gallop and no friction rub. No carotid bruits. No peripheral edema. Pulmonary/Chest:  Lungs clear to auscultation bilaterally without evidence of respiratory distress. He without wheezes. He without rales or ronchi. Musculoskeletal: Normal range of motion. Gait is normal  Head is normocephalic and atraumatic. Skin: Skin is warm and dry without rash or pallor. Psychiatric:He is alert and oriented to person, place, and time. He has a normal mood and affect. His behavior is normal. Thought content normal.       Lab Results   Component Value Date    CREATININE 0.8 05/26/2022    CREATININE 1.0 04/18/2022    CREATININE 0.9 03/24/2022    HGB 16.2 05/26/2022    HGB 14.0 04/18/2022    HGB 14.8 03/24/2022    PROBNP 33 04/18/2022              Assessment, Recommendations, & Plan:  39 y.o. male with inappropriate sinus tachycardia. Continue diltiazem indefinitely and I will discharge him back to the care of his primary care physician. I will check a TSH as I do not see that there was one in the system. Disposition - RTC as needed      Please do not hesitate to contact me for any questions or concerns. Dr. Sonia Braden. [place]  Electrophysiology and Cardiology  [facility] [facility] [place], Cardiology  [phone]

## 2022-06-22 ENCOUNTER — TELEPHONE (OUTPATIENT)
Dept: VASCULAR SURGERY | Age: 37
End: 2022-06-22

## 2022-06-22 DIAGNOSIS — I70.219 ATHEROSCLEROSIS WITH CLAUDICATION OF EXTREMITY (HCC): ICD-10-CM

## 2022-06-22 DIAGNOSIS — R00.0 TACHYCARDIA: ICD-10-CM

## 2022-06-22 LAB
ALBUMIN SERPL-MCNC: 4.7 G/DL (ref 3.5–5.2)
ALP BLD-CCNC: 139 U/L (ref 40–130)
ALT SERPL-CCNC: 82 U/L (ref 5–41)
ANION GAP SERPL CALCULATED.3IONS-SCNC: 15 MMOL/L (ref 7–19)
AST SERPL-CCNC: 32 U/L (ref 5–40)
BILIRUB SERPL-MCNC: 0.4 MG/DL (ref 0.2–1.2)
BUN BLDV-MCNC: 12 MG/DL (ref 6–20)
CALCIUM SERPL-MCNC: 9.8 MG/DL (ref 8.6–10)
CHLORIDE BLD-SCNC: 105 MMOL/L (ref 98–111)
CO2: 25 MMOL/L (ref 22–29)
CREAT SERPL-MCNC: 1 MG/DL (ref 0.5–1.2)
GFR AFRICAN AMERICAN: >59
GFR NON-AFRICAN AMERICAN: >60
GLUCOSE BLD-MCNC: 97 MG/DL (ref 74–109)
POTASSIUM SERPL-SCNC: 4.2 MMOL/L (ref 3.5–5)
SODIUM BLD-SCNC: 145 MMOL/L (ref 136–145)
TOTAL PROTEIN: 7.7 G/DL (ref 6.6–8.7)
TSH REFLEX FT4: 1.17 UIU/ML (ref 0.35–5.5)

## 2022-06-22 NOTE — TELEPHONE ENCOUNTER
Spoke with patient to let him know the following. Patient was confused but said he would follow up with the PCP like he was told.            ----- Message from ARGENTINA Gordon sent at 6/22/2022  3:09 PM CDT -----  Please let patient know the liver enzyme went up again.   We would recommend he stop lipitor and we need to make him follow up appt with his pcp in regards to this

## 2022-06-24 ENCOUNTER — TELEPHONE (OUTPATIENT)
Dept: VASCULAR SURGERY | Age: 37
End: 2022-06-24

## 2022-06-24 NOTE — TELEPHONE ENCOUNTER
Patient called this morning stating that his PCP is sending him as a new referral to [facility]. He has requested a CD of the surgery that Dr. Mary Fontana did for him to be overnighted to his home address. As he has an epi with [facility] on Monday and doesn't have time to get to [place] today to get the CD. I have had the CD made and will overnight it to him today.
